# Patient Record
Sex: FEMALE | Race: BLACK OR AFRICAN AMERICAN | NOT HISPANIC OR LATINO | Employment: UNEMPLOYED | URBAN - METROPOLITAN AREA
[De-identification: names, ages, dates, MRNs, and addresses within clinical notes are randomized per-mention and may not be internally consistent; named-entity substitution may affect disease eponyms.]

---

## 2017-01-25 ENCOUNTER — ROUTINE PRENATAL (OUTPATIENT)
Dept: OBGYN | Facility: CLINIC | Age: 27
End: 2017-01-25
Payer: MEDICAID

## 2017-01-25 ENCOUNTER — ROUTINE PRENATAL (OUTPATIENT)
Dept: OBGYN | Facility: CLINIC | Age: 27
End: 2017-01-25

## 2017-01-25 VITALS — DIASTOLIC BLOOD PRESSURE: 62 MMHG | SYSTOLIC BLOOD PRESSURE: 122 MMHG | BODY MASS INDEX: 34.72 KG/M2 | WEIGHT: 242 LBS

## 2017-01-25 DIAGNOSIS — O28.0 ABNORMAL QUAD SCREEN: ICD-10-CM

## 2017-01-25 LAB
NST ACOUSTIC STIMULATION: NORMAL
NST ACTION NECESSARY: NORMAL
NST ASSESSMENT: REACTIVE
NST BASELINE: 150
NST INDICATIONS: NORMAL
NST OTHER DATA: NORMAL
NST READ BY: NORMAL
NST RETURN: NORMAL
NST UTERINE ACTIVITY: NORMAL

## 2017-01-25 PROCEDURE — 59025 FETAL NON-STRESS TEST: CPT | Performed by: OBSTETRICS & GYNECOLOGY

## 2017-01-25 PROCEDURE — 90040 PR PRENATAL FOLLOW UP: CPT | Performed by: OBSTETRICS & GYNECOLOGY

## 2017-01-25 NOTE — MR AVS SNAPSHOT
Dulce Livecassandra   2017 3:30 PM   Routine Prenatal   MRN: [id number]    [facility]   Dept Phone:  [phone]    Description:  Female : 1990   Provider:  Ronnie Cantor M.D.           Allergies as of 2017     Not on File      You were diagnosed with     Abnormal quad screen   [604766]         Vital Signs     Blood Pressure Weight Last Menstrual Period Smoking Status          122/62 mmHg 109.77 kg (242 lb) 2016 Never Smoker         Basic Information     Date Of Birth Sex Race Ethnicity Preferred Language    1990 Female Black or  Non- English      Your appointments     2017 10:00 AM   Fetal Non-Stress Test with PC NST   [facility])    [address] [place] [address] [address]  [employer] [place] [address]   [phone]            2017 11:00 AM   OB Follow Up with Bhakti Patiño D.N.P.   [facility])    [address] [place] [address]  [employer] [place] [address]   [phone]            2017 10:00 AM   Fetal Non-Stress Test with PC NST   [facility])    [address] [place] [address]  [employer] [place] [address]   [phone]              Problem List              ICD-10-CM Priority Class Noted - Resolved    Supervision of other normal pregnancy, antepartum Z34.80   2016 - Present    Abnormal quad screen O28.0   2016 - Present      Health Maintenance        Date Due Completion Dates    IMM INFLUENZA (1) 2016 ---    PAP SMEAR 2019    IMM DTaP/Tdap/Td Vaccine (2 - Td) 2026            Results     Fetal Nonstress Test      Component    NST Indications    abnormal AFP tetra    NST Baseline    150    NST Uterine Activity    rare contractions    NST Acoustic Stimulation    ×1    NST Assessment    reactive    NST Action Necessary    none    NST Other Data    NST Return    NST Read By                        Current Immunizations     Tdap Vaccine 2016 10:10 AM      Below and/or attached  are the medications your provider expects you to take. Review all of your home medications and newly ordered medications with your provider and/or pharmacist. Follow medication instructions as directed by your provider and/or pharmacist. Please keep your medication list with you and share with your provider. Update the information when medications are discontinued, doses are changed, or new medications (including over-the-counter products) are added; and carry medication information at all times in the event of emergency situations     Allergies:  No Known Allergies          Medications  Valid as of: January 25, 2017 -  4:40 PM    Generic Name Brand Name Tablet Size Instructions for use    Prenatal Multivit-Min-Fe-FA   Take  by mouth.        .                 Medicines prescribed today were sent to:     [facility] [address] [place] [address] - [address] [place] [address]    Phone: [phone] Fax: [phone]    Open 24 Hours?: No      Medication refill instructions:       If your prescription bottle indicates you have medication refills left, it is not necessary to call your provider’s office. Please contact your pharmacy and they will refill your medication.    If your prescription bottle indicates you do not have any refills left, you may request refills at any time through one of the following ways: The online [employer] system (except Urgent Care), by calling your provider’s office, or by asking your pharmacy to contact your provider’s office with a refill request. Medication refills are processed only during regular business hours and may not be available until the next business day. Your provider may request additional information or to have a follow-up visit with you prior to refilling your medication.   *Please Note: Medication refills are assigned a new Rx number when refilled electronically. Your pharmacy may indicate that no refills were authorized even though a new  prescription for the same medication is available at the pharmacy. Please request the medicine by name with the pharmacy before contacting your provider for a refill.        Other Notes About Your Plan     GIRL           MyChart Status: Patient Declined

## 2017-01-25 NOTE — MR AVS SNAPSHOT
Dulce Livecassandra   2017 3:00 PM   Routine Prenatal   MRN: [id number]    [facility]  [facility]   Dept Phone:  [phone]    Description:  Female : 1990   Provider:  Ronnie Cantor M.D.           Allergies as of 2017     Not on File      You were diagnosed with     Abnormal quad screen   [[id number]]         Vital Signs     Last Menstrual Period Smoking Status                2016 Never Smoker           Basic Information     Date Of Birth Sex Race Ethnicity Preferred Language    1990 Female Black or  Non- English      Your appointments     2017 10:00 AM   Fetal Non-Stress Test with PC NST   [facility])    [address] [place] [address]  [place] [address] [phone]            2017 11:00 AM   OB Follow Up with Bhakti Patiño D.N.P.   [facility])    [address] [place] [address] [address]  [place] [address]   [phone]            2017 10:00 AM   Fetal Non-Stress Test with PC NST   [facility])    [address] [place] [address]  [place] [address]   [phone]              Problem List              ICD-10-CM Priority Class Noted - Resolved    Supervision of other normal pregnancy, antepartum Z34.80   2016 - Present    Abnormal quad screen O28.0   2016 - Present      Health Maintenance        Date Due Completion Dates    IMM INFLUENZA (1) 2016 ---    PAP SMEAR 2019    IMM DTaP/Tdap/Td Vaccine (2 - Td) 2026            Current Immunizations     Tdap Vaccine 2016 10:10 AM      Below and/or attached are the medications your provider expects you to take. Review all of your home medications and newly ordered medications with your provider and/or pharmacist. Follow medication instructions as directed by your provider and/or pharmacist. Please keep your medication list with you and share with your provider. Update the information when medications are discontinued,  doses are changed, or new medications (including over-the-counter products) are added; and carry medication information at all times in the event of emergency situations     Allergies:  No Known Allergies          Medications  Valid as of: January 25, 2017 -  4:40 PM    Generic Name Brand Name Tablet Size Instructions for use    Prenatal Multivit-Min-Fe-FA   Take  by mouth.        .                 Medicines prescribed today were sent to:     [facility] [address] [place] [address], [place] - [address] [place] [address]    Phone: [phone] Fax: [phone]    Open 24 Hours?: No      Medication refill instructions:       If your prescription bottle indicates you have medication refills left, it is not necessary to call your provider’s office. Please contact your pharmacy and they will refill your medication.    If your prescription bottle indicates you do not have any refills left, you may request refills at any time through one of the following ways: The online [email or web address] system (except Urgent Care), by calling your provider’s office, or by asking your pharmacy to contact your provider’s office with a refill request. Medication refills are processed only during regular business hours and may not be available until the next business day. Your provider may request additional information or to have a follow-up visit with you prior to refilling your medication.   *Please Note: Medication refills are assigned a new Rx number when refilled electronically. Your pharmacy may indicate that no refills were authorized even though a new prescription for the same medication is available at the pharmacy. Please request the medicine by name with the pharmacy before contacting your provider for a refill.        Other Notes About Your Plan     GIRL           MyChart Status: Patient Declined

## 2017-01-25 NOTE — PROGRESS NOTES
OB Followup;    34w4d    Patient Active Problem List    Diagnosis Date Noted   • Abnormal quad screen 11/17/2016   • Supervision of other normal pregnancy, antepartum 09/02/2016       Filed Vitals:    01/25/17 1538   BP: 122/62   Weight: 109.77 kg (242 lb)       Patient presents for followup of OB care. Currently doing well . Good fetal movement no leakage of fluid no contractions or vaginal bleeding        Size equals dates, normal fetal heart rate    Unexplained abnormal AFP tetra testing:  Patient's history of abnormal AFP quad testing with normal NIPS and normal fetal anatomy on ultrasound by M. Twice weekly NSTs are indicated.    Labor precautions given  Increase oral hydration    Followup in one weeks

## 2017-01-25 NOTE — PROGRESS NOTES
Pt. Here for OB/FU today. Reports Good FM.   Good # [phone]  Pt states no complaints.   Pharmacy verified.

## 2017-01-31 ENCOUNTER — ROUTINE PRENATAL (OUTPATIENT)
Dept: OBGYN | Facility: CLINIC | Age: 27
End: 2017-01-31

## 2017-01-31 ENCOUNTER — HOSPITAL ENCOUNTER (OUTPATIENT)
Facility: MEDICAL CENTER | Age: 27
End: 2017-01-31
Attending: NURSE PRACTITIONER
Payer: COMMERCIAL

## 2017-01-31 ENCOUNTER — ROUTINE PRENATAL (OUTPATIENT)
Dept: OBGYN | Facility: CLINIC | Age: 27
End: 2017-01-31
Payer: MEDICAID

## 2017-01-31 VITALS — WEIGHT: 244 LBS | BODY MASS INDEX: 35.01 KG/M2 | SYSTOLIC BLOOD PRESSURE: 120 MMHG | DIASTOLIC BLOOD PRESSURE: 59 MMHG

## 2017-01-31 DIAGNOSIS — O28.0 ABNORMAL QUAD SCREEN: ICD-10-CM

## 2017-01-31 LAB
NST ACOUSTIC STIMULATION: NORMAL
NST ACTION NECESSARY: NORMAL
NST ASSESSMENT: NORMAL
NST BASELINE: 145
NST INDICATIONS: NORMAL
NST OTHER DATA: NORMAL
NST READ BY: NORMAL
NST RETURN: NORMAL
NST UTERINE ACTIVITY: NORMAL

## 2017-01-31 PROCEDURE — 90040 PR PRENATAL FOLLOW UP: CPT | Performed by: NURSE PRACTITIONER

## 2017-01-31 PROCEDURE — 59025 FETAL NON-STRESS TEST: CPT | Performed by: NURSE PRACTITIONER

## 2017-01-31 NOTE — MR AVS SNAPSHOT
Dulce Pineda   2017 11:00 AM   Routine Prenatal   MRN: [id number]    [facility]   Dept Phone:  [phone]    Description:  Female : 1990   Provider:  Bhakti Patiño D.N.P.           Allergies as of 2017     No Known Allergies      You were diagnosed with     Abnormal quad screen   [[id number]]         Vital Signs     Blood Pressure Weight Last Menstrual Period Smoking Status          120/59 mmHg 110.678 kg (244 lb) 2016 Never Smoker         Basic Information     Date Of Birth Sex Race Ethnicity Preferred Language    1990 Female Black or  Non- English      Your appointments     2017 10:00 AM   Fetal Non-Stress Test with PC NST   [facility] ([facility])    [address] [place] [address] [address]  [facility] [address] [phone]              Problem List              ICD-10-CM Priority Class Noted - Resolved    Supervision of other normal pregnancy, antepartum Z34.80   2016 - Present    Abnormal quad screen O28.0   2016 - Present      Health Maintenance        Date Due Completion Dates    IMM INFLUENZA (1) 2016 ---    PAP SMEAR 2019    IMM DTaP/Tdap/Td Vaccine (2 - Td) 2026            Current Immunizations     Tdap Vaccine 2016 10:10 AM      Below and/or attached are the medications your provider expects you to take. Review all of your home medications and newly ordered medications with your provider and/or pharmacist. Follow medication instructions as directed by your provider and/or pharmacist. Please keep your medication list with you and share with your provider. Update the information when medications are discontinued, doses are changed, or new medications (including over-the-counter products) are added; and carry medication information at all times in the event of emergency situations     Allergies:  No Known Allergies          Medications  Valid as of: 2017 - 11:24 AM    Generic Name Brand Name Tablet Size Instructions for use    Prenatal Multivit-Min-Fe-FA   Take  by mouth.        .                 Medicines prescribed today were sent to:     [facility] [address] [place] [address] [place] - [address] [place] [address]    Phone: [phone] Fax: [phone]    Open 24 Hours?: No      Medication refill instructions:       If your prescription bottle indicates you have medication refills left, it is not necessary to call your provider’s office. Please contact your pharmacy and they will refill your medication.    If your prescription bottle indicates you do not have any refills left, you may request refills at any time through one of the following ways: The online [employer] system (except Urgent Care), by calling your provider’s office, or by asking your pharmacy to contact your provider’s office with a refill request. Medication refills are processed only during regular business hours and may not be available until the next business day. Your provider may request additional information or to have a follow-up visit with you prior to refilling your medication.   *Please Note: Medication refills are assigned a new Rx number when refilled electronically. Your pharmacy may indicate that no refills were authorized even though a new prescription for the same medication is available at the pharmacy. Please request the medicine by name with the pharmacy before contacting your provider for a refill.        Your To Do List     Future Labs/Procedures Complete By Expires    GRP B STREP, BY PCR (PEREZ BROTH)  As directed 1/31/2018    Comments:    Source - genital/rectal    INDUCTION OF LABOR  As directed 1/31/2018      Other Notes About Your Plan     GIRL           MyChart Status: Patient Declined

## 2017-01-31 NOTE — MR AVS SNAPSHOT
Dulce Livecassandra   2017 10:00 AM   Routine Prenatal   MRN: [id number]    [facility]   Dept Phone:  [phone]    Description:  Female : 1990   Provider:  Bhakti Patiño D.N.P.           Allergies as of 2017     No Known Allergies      You were diagnosed with     Abnormal quad screen   [[id number]]         Vital Signs     Last Menstrual Period Smoking Status                2016 Never Smoker           Basic Information     Date Of Birth Sex Race Ethnicity Preferred Language    1990 Female Black or  Non- English      Your appointments     2017 10:00 AM   Fetal Non-Stress Test with PC NST   [facility])    [address] [facility] [address]  [place] [address] [phone]              Problem List              ICD-10-CM Priority Class Noted - Resolved    Supervision of other normal pregnancy, antepartum Z34.80   2016 - Present    Abnormal quad screen O28.0   2016 - Present      Health Maintenance        Date Due Completion Dates    IMM INFLUENZA (1) 2016 ---    PAP SMEAR 2019    IMM DTaP/Tdap/Td Vaccine (2 - Td) 2026            Results     POCT NST      Component    NST Indications    abnormal AFP    NST Baseline    145    NST Uterine Activity    none    NST Acoustic Stimulation    none    NST Assessment    Category one, pos accels, no decels, moderate variability    NST Action Necessary    NST Other Data    NST Return    NST Read By                        Current Immunizations     Tdap Vaccine 2016 10:10 AM      Below and/or attached are the medications your provider expects you to take. Review all of your home medications and newly ordered medications with your provider and/or pharmacist. Follow medication instructions as directed by your provider and/or pharmacist. Please keep your medication list with you and share with your provider. Update the information when medications  are discontinued, doses are changed, or new medications (including over-the-counter products) are added; and carry medication information at all times in the event of emergency situations     Allergies:  No Known Allergies          Medications  Valid as of: January 31, 2017 - 11:24 AM    Generic Name Brand Name Tablet Size Instructions for use    Prenatal Multivit-Min-Fe-FA   Take  by mouth.        .                 Medicines prescribed today were sent to:     [facility] [address] [place] [address], [place] - [address] [place] [address]    Phone: [phone] Fax: [phone]    Open 24 Hours?: No      Medication refill instructions:       If your prescription bottle indicates you have medication refills left, it is not necessary to call your provider’s office. Please contact your pharmacy and they will refill your medication.    If your prescription bottle indicates you do not have any refills left, you may request refills at any time through one of the following ways: The online [email or web address] system (except Urgent Care), by calling your provider’s office, or by asking your pharmacy to contact your provider’s office with a refill request. Medication refills are processed only during regular business hours and may not be available until the next business day. Your provider may request additional information or to have a follow-up visit with you prior to refilling your medication.   *Please Note: Medication refills are assigned a new Rx number when refilled electronically. Your pharmacy may indicate that no refills were authorized even though a new prescription for the same medication is available at the pharmacy. Please request the medicine by name with the pharmacy before contacting your provider for a refill.        Other Notes About Your Plan     GIRL           [employer] Status: Patient Declined

## 2017-01-31 NOTE — PROGRESS NOTES
Pt here today for OB follow up   GBS to be done today  Reports +FM  WT: 244 lb  BP: 120/59  Pt states no complications today  Good # [phone]

## 2017-01-31 NOTE — PROGRESS NOTES
S) Pt is a 26 y.o.   at 35w3d  gestation. Routine prenatal care today. States no specific problems today. No follow up indicated with PANN. Continuing 2x week nst for abnormal afp.  Reports good  fetal movement. Denies cramping, bleeding or leaking of fluid. Denies dysuria. Generally feels well today. Good self-care activities identified. Denies headaches.     O) see flow         Filed Vitals:    17 1045   BP: 120/59   Weight: 110.678 kg (244 lb)           Lab:  Recent Results (from the past 336 hour(s))   Fetal Nonstress Test    Collection Time: 17  3:50 PM   Result Value Ref Range    NST Indications abnormal AFP tetra     NST Baseline 150     NST Uterine Activity rare contractions     NST Acoustic Stimulation ×1     NST Assessment reactive     NST Action Necessary none     NST Other Data      NST Return      NST Read By     POCT NST    Collection Time: 17 10:17 AM   Result Value Ref Range    NST Indications      NST Baseline      NST Uterine Activity      NST Acoustic Stimulation      NST Assessment      NST Action Necessary      NST Other Data      NST Return      NST Read By            Pertinent ultrasound - See PANN documents           A) IUP at 35w3d       S=D         Patient Active Problem List    Diagnosis Date Noted   • Abnormal quad screen 2016   • Supervision of other normal pregnancy, antepartum 2016       P) s/s ptl vs general discomforts. Fetal movements reviewed. General ed and anticipatory guidance. Nutrition/exercise/vitamin. Plans breast.  Plans pp contraception - OCP. Continue 2x week nst. Order placed for induction of labor to be scheduled at 39 weeks. Continue PNV.

## 2017-02-02 LAB — GP B STREP DNA SPEC QL NAA+PROBE: NEGATIVE

## 2017-02-03 ENCOUNTER — ROUTINE PRENATAL (OUTPATIENT)
Dept: OBGYN | Facility: CLINIC | Age: 27
End: 2017-02-03

## 2017-02-03 DIAGNOSIS — O28.0 ABNORMAL ANTENATAL ALPHA FETOPROTEIN SCREEN: ICD-10-CM

## 2017-02-03 LAB
NST ACOUSTIC STIMULATION: YES
NST ACTION NECESSARY: NORMAL
NST ASSESSMENT: REACTIVE
NST BASELINE: 145
NST INDICATIONS: NORMAL
NST OTHER DATA: NORMAL
NST READ BY: NORMAL
NST RETURN: NORMAL
NST UTERINE ACTIVITY: NO

## 2017-02-03 PROCEDURE — 59025 FETAL NON-STRESS TEST: CPT | Performed by: OBSTETRICS & GYNECOLOGY

## 2017-02-03 NOTE — MR AVS SNAPSHOT
Dulce Livecassandra   2/3/2017 10:00 AM   Routine Prenatal   MRN: [id number]    [facility]   Dept Phone:  [phone]    Description:  Female : 1990   Provider:  Shaheen Hitchcock M.D.           Allergies as of 2/3/2017     No Known Allergies      You were diagnosed with     Abnormal  alpha fetoprotein screen   [[id number]]         Vital Signs     Last Menstrual Period Smoking Status                2016 Never Smoker           Basic Information     Date Of Birth Sex Race Ethnicity Preferred Language    1990 Female Black or  Non- English      Your appointments     2017  9:00 AM   [facility] INDUCTION OF LABOR with NON-SURGICAL L&D   LABOR & DELIVERY [facility] (--)    [address] [facility] [address] [phone]              Problem List              ICD-10-CM Priority Class Noted - Resolved    Supervision of other normal pregnancy, antepartum Z34.80   2016 - Present    Abnormal quad screen O28.0   2016 - Present      Health Maintenance        Date Due Completion Dates    IMM INFLUENZA (1) 2016 ---    PAP SMEAR 2019    IMM DTaP/Tdap/Td Vaccine (2 - Td) 2026            Results     POCT Fetal Nonstress Test      Component    NST Indications    abnormal AFP    NST Baseline    145    NST Uterine Activity    no    NST Acoustic Stimulation    yes    NST Assessment    reactive    Comment:     after vas    NST Action Necessary    NST Other Data    NST Return    NST Read By    Naldo                        Current Immunizations     Tdap Vaccine 2016 10:10 AM      Below and/or attached are the medications your provider expects you to take. Review all of your home medications and newly ordered medications with your provider and/or pharmacist. Follow medication instructions as directed by your provider and/or pharmacist. Please keep your medication list with you and share with your provider. Update the  information when medications are discontinued, doses are changed, or new medications (including over-the-counter products) are added; and carry medication information at all times in the event of emergency situations     Allergies:  No Known Allergies          Medications  Valid as of: February 03, 2017 - 11:15 AM    Generic Name Brand Name Tablet Size Instructions for use    Prenatal Multivit-Min-Fe-FA   Take  by mouth.        .                 Medicines prescribed today were sent to:     [facility] [address] [place] [address], [place] - [address] [place] [address]    Phone: [phone] Fax: [phone]    Open 24 Hours?: No      Medication refill instructions:       If your prescription bottle indicates you have medication refills left, it is not necessary to call your provider’s office. Please contact your pharmacy and they will refill your medication.    If your prescription bottle indicates you do not have any refills left, you may request refills at any time through one of the following ways: The online [employer] system (except Urgent Care), by calling your provider’s office, or by asking your pharmacy to contact your provider’s office with a refill request. Medication refills are processed only during regular business hours and may not be available until the next business day. Your provider may request additional information or to have a follow-up visit with you prior to refilling your medication.   *Please Note: Medication refills are assigned a new Rx number when refilled electronically. Your pharmacy may indicate that no refills were authorized even though a new prescription for the same medication is available at the pharmacy. Please request the medicine by name with the pharmacy before contacting your provider for a refill.        Other Notes About Your Plan     GIRL           Biscottihart Status: Patient Declined

## 2017-02-07 ENCOUNTER — ROUTINE PRENATAL (OUTPATIENT)
Dept: OBGYN | Facility: CLINIC | Age: 27
End: 2017-02-07

## 2017-02-07 DIAGNOSIS — R77.2 ABNORMAL AFP3 TEST: ICD-10-CM

## 2017-02-07 LAB
NST ACOUSTIC STIMULATION: NO
NST ACTION NECESSARY: NORMAL
NST ASSESSMENT: REACTIVE
NST BASELINE: 150
NST INDICATIONS: NORMAL
NST OTHER DATA: NORMAL
NST READ BY: NORMAL
NST RETURN: NORMAL
NST UTERINE ACTIVITY: NO

## 2017-02-07 PROCEDURE — 59025 FETAL NON-STRESS TEST: CPT | Performed by: OBSTETRICS & GYNECOLOGY

## 2017-02-07 NOTE — MR AVS SNAPSHOT
Dulce Corley Miriam   2017 10:00 AM   Routine Prenatal   MRN: [id number]    [facility]   Dept Phone:  [phone]    Description:  Female : 1990   Provider:  Shaheen Hitchcock M.D.           Allergies as of 2017     No Known Allergies      You were diagnosed with     Abnormal AFP3 test   [740698]         Vital Signs     Last Menstrual Period Smoking Status                2016 Never Smoker           Basic Information     Date Of Birth Sex Race Ethnicity Preferred Language    1990 Female Black or  Non- English      Your appointments     Feb 10, 2017  8:30 AM   Fetal Non-Stress Test with PC NST   [facility] [address]  [facility] [id number] [phone]            Feb 10, 2017  9:15 AM   OB Follow Up with PC MD   [facility] [address]  [facility] [address] [phone]            2017  8:00 AM   TPC INDUCTION OF LABOR with NON-SURGICAL L&D   LABOR & DELIVERY [facility] (--)    [address] [facility] [address]   [phone]              Problem List              ICD-10-CM Priority Class Noted - Resolved    Supervision of other normal pregnancy, antepartum Z34.80   2016 - Present    Abnormal quad screen O28.0   2016 - Present      Health Maintenance        Date Due Completion Dates    IMM INFLUENZA (1) 2016 ---    PAP SMEAR 2019    IMM DTaP/Tdap/Td Vaccine (2 - Td) 2026            Results     POCT Fetal Nonstress Test      Component    NST Indications    Abnroaml AFP    NST Baseline    150    Comment:     `    NST Uterine Activity    no    NST Acoustic Stimulation    no    NST Assessment    reactive    NST Action Necessary    NST Other Data    NST Return    NST Read By    Naldo                        Current Immunizations     Tdap Vaccine 2016 10:10 AM      Below and/or attached are the medications your provider expects you to  take. Review all of your home medications and newly ordered medications with your provider and/or pharmacist. Follow medication instructions as directed by your provider and/or pharmacist. Please keep your medication list with you and share with your provider. Update the information when medications are discontinued, doses are changed, or new medications (including over-the-counter products) are added; and carry medication information at all times in the event of emergency situations     Allergies:  No Known Allergies          Medications  Valid as of: February 07, 2017 - 12:57 PM    Generic Name Brand Name Tablet Size Instructions for use    Prenatal Multivit-Min-Fe-FA   Take  by mouth.        .                 Medicines prescribed today were sent to:     [facility] [address] [place] [address], [place] - [address] [facility]    [address] [facility] [place] [address]    Phone: [phone] Fax: [phone]    Open 24 Hours?: No      Medication refill instructions:       If your prescription bottle indicates you have medication refills left, it is not necessary to call your provider’s office. Please contact your pharmacy and they will refill your medication.    If your prescription bottle indicates you do not have any refills left, you may request refills at any time through one of the following ways: The online Chatterous system (except Urgent Care), by calling your provider’s office, or by asking your pharmacy to contact your provider’s office with a refill request. Medication refills are processed only during regular business hours and may not be available until the next business day. Your provider may request additional information or to have a follow-up visit with you prior to refilling your medication.   *Please Note: Medication refills are assigned a new Rx number when refilled electronically. Your pharmacy may indicate that no refills were authorized even though a new prescription for the same medication is available at  the pharmacy. Please request the medicine by name with the pharmacy before contacting your provider for a refill.        Other Notes About Your Plan     GIRL           MyChart Status: Patient Declined

## 2017-02-10 ENCOUNTER — ROUTINE PRENATAL (OUTPATIENT)
Dept: OBGYN | Facility: CLINIC | Age: 27
End: 2017-02-10

## 2017-02-10 VITALS — DIASTOLIC BLOOD PRESSURE: 64 MMHG | SYSTOLIC BLOOD PRESSURE: 115 MMHG | BODY MASS INDEX: 34.87 KG/M2 | WEIGHT: 243 LBS

## 2017-02-10 DIAGNOSIS — O28.0 ABNORMAL QUAD SCREEN: ICD-10-CM

## 2017-02-10 LAB
NST ACOUSTIC STIMULATION: NO
NST ACTION NECESSARY: NORMAL
NST ASSESSMENT: REACTIVE
NST BASELINE: 130
NST INDICATIONS: NORMAL
NST OTHER DATA: NORMAL
NST READ BY: NORMAL
NST RETURN: NORMAL
NST UTERINE ACTIVITY: NORMAL

## 2017-02-10 PROCEDURE — 59025 FETAL NON-STRESS TEST: CPT | Performed by: OBSTETRICS & GYNECOLOGY

## 2017-02-10 PROCEDURE — 90040 PR PRENATAL FOLLOW UP: CPT | Performed by: OBSTETRICS & GYNECOLOGY

## 2017-02-10 NOTE — PROGRESS NOTES
Pt here for OB f/u. Denies VB,LOF. Reports +FM and irregular UCs.  Good phone# [phone]  Pharmacy verified with pt.  Pt states she is aware IOL date and time.

## 2017-02-10 NOTE — MR AVS SNAPSHOT
Dulce Livecassandra   2/10/2017 8:30 AM   Routine Prenatal   MRN: [id number]    [facility]   Dept Phone:  [phone]    Description:  Female : 1990   Provider:  Aylin Baker M.D.           Allergies as of 2/10/2017     No Known Allergies      You were diagnosed with     Abnormal quad screen   [[id number]]         Vital Signs     Last Menstrual Period Smoking Status                2016 Never Smoker           Basic Information     Date Of Birth Sex Race Ethnicity Preferred Language    1990 Female Black or  Non- English      Your appointments     2017  8:00 AM   TPC INDUCTION OF LABOR with NON-SURGICAL L&D   LABOR & DELIVERY [facility] (--)    [address] [facility] [address] [phone]              Problem List              ICD-10-CM Priority Class Noted - Resolved    Supervision of other normal pregnancy, antepartum Z34.80   2016 - Present    Abnormal quad screen O28.0   2016 - Present      Health Maintenance        Date Due Completion Dates    IMM INFLUENZA (1) 2016 ---    PAP SMEAR 2019    IMM DTaP/Tdap/Td Vaccine (2 - Td) 2026            Results     POCT NST      Component    NST Indications    abn quad    NST Baseline    130    NST Uterine Activity    one    NST Acoustic Stimulation    no    NST Assessment    reactive    NST Action Necessary    NST Other Data    NST Return    NST Read By                        Current Immunizations     Tdap Vaccine 2016 10:10 AM      Below and/or attached are the medications your provider expects you to take. Review all of your home medications and newly ordered medications with your provider and/or pharmacist. Follow medication instructions as directed by your provider and/or pharmacist. Please keep your medication list with you and share with your provider. Update the information when medications are discontinued, doses are changed, or new medications  (including over-the-counter products) are added; and carry medication information at all times in the event of emergency situations     Allergies:  No Known Allergies          Medications  Valid as of: February 10, 2017 - 10:00 AM    Generic Name Brand Name Tablet Size Instructions for use    Prenatal Multivit-Min-Fe-FA   Take  by mouth.        .                 Medicines prescribed today were sent to:     [facility] [address] [place] [address], [place] - [address] [place] [address]    Phone: [phone] Fax: [phone]    Open 24 Hours?: No      Medication refill instructions:       If your prescription bottle indicates you have medication refills left, it is not necessary to call your provider’s office. Please contact your pharmacy and they will refill your medication.    If your prescription bottle indicates you do not have any refills left, you may request refills at any time through one of the following ways: The online Dayjet system (except Urgent Care), by calling your provider’s office, or by asking your pharmacy to contact your provider’s office with a refill request. Medication refills are processed only during regular business hours and may not be available until the next business day. Your provider may request additional information or to have a follow-up visit with you prior to refilling your medication.   *Please Note: Medication refills are assigned a new Rx number when refilled electronically. Your pharmacy may indicate that no refills were authorized even though a new prescription for the same medication is available at the pharmacy. Please request the medicine by name with the pharmacy before contacting your provider for a refill.        Other Notes About Your Plan     GIRL           MyChart Status: Patient Declined

## 2017-02-10 NOTE — MR AVS SNAPSHOT
Dulce Pineda   2/10/2017 9:15 AM   Routine Prenatal   MRN: [id number]    [facility]   Dept Phone:  [phone]    Description:  Female : 1990   Provider:  Aylin Baker M.D.           Allergies as of 2/10/2017     No Known Allergies      You were diagnosed with     Abnormal quad screen   [[id number]]         Vital Signs     Blood Pressure Weight Last Menstrual Period Smoking Status          115/64 mmHg 110.224 kg (243 lb) 2016 Never Smoker         Basic Information     Date Of Birth Sex Race Ethnicity Preferred Language    1990 Female Black or  Non- English      Your appointments     2017  8:00 AM   TPC INDUCTION OF LABOR with NON-SURGICAL L&D   [facility] (--)    [address] [place] [address] [phone]              Problem List              ICD-10-CM Priority Class Noted - Resolved    Supervision of other normal pregnancy, antepartum Z34.80   2016 - Present    Abnormal quad screen O28.0   2016 - Present      Health Maintenance        Date Due Completion Dates    IMM INFLUENZA (1) 2016 ---    PAP SMEAR 2019    IMM DTaP/Tdap/Td Vaccine (2 - Td) 2026            Current Immunizations     Tdap Vaccine 2016 10:10 AM      Below and/or attached are the medications your provider expects you to take. Review all of your home medications and newly ordered medications with your provider and/or pharmacist. Follow medication instructions as directed by your provider and/or pharmacist. Please keep your medication list with you and share with your provider. Update the information when medications are discontinued, doses are changed, or new medications (including over-the-counter products) are added; and carry medication information at all times in the event of emergency situations     Allergies:  No Known Allergies          Medications  Valid as of: February 10, 2017 - 10:00 AM       Generic Name Brand Name Tablet Size Instructions for use    Prenatal Multivit-Min-Fe-FA   Take  by mouth.        .                 Medicines prescribed today were sent to:     [facility] [address] [place] [address], [place] - [address] [place] [address]    Phone: [phone] Fax: [phone]    Open 24 Hours?: No      Medication refill instructions:       If your prescription bottle indicates you have medication refills left, it is not necessary to call your provider’s office. Please contact your pharmacy and they will refill your medication.    If your prescription bottle indicates you do not have any refills left, you may request refills at any time through one of the following ways: The online BigTwist system (except Urgent Care), by calling your provider’s office, or by asking your pharmacy to contact your provider’s office with a refill request. Medication refills are processed only during regular business hours and may not be available until the next business day. Your provider may request additional information or to have a follow-up visit with you prior to refilling your medication.   *Please Note: Medication refills are assigned a new Rx number when refilled electronically. Your pharmacy may indicate that no refills were authorized even though a new prescription for the same medication is available at the pharmacy. Please request the medicine by name with the pharmacy before contacting your provider for a refill.        Other Notes About Your Plan     GIRL           [employer] Status: Patient Declined

## 2017-02-10 NOTE — PROGRESS NOTES
Dulce Pineda is a 26 y.o.  at 36w6d here today for obstetrical visit.  Patient is without complaints.    She reports good fetal movement.  She denies vaginal bleeding.  She denies rupture of membranes.  She denies contractions.     has Supervision of other normal pregnancy, antepartum and Abnormal quad screen on her problem list.    Induction of labor is scheduled for  at 8 AM  GBS negative      A/P IUP at 36w6d  AFP done  1 hour glucola done  Rhogam a pos  GBS neg    F/U in 1 weeks

## 2017-02-14 ENCOUNTER — ROUTINE PRENATAL (OUTPATIENT)
Dept: OBGYN | Facility: CLINIC | Age: 27
End: 2017-02-14

## 2017-02-14 DIAGNOSIS — O28.0 ABNORMAL QUAD SCREEN: ICD-10-CM

## 2017-02-14 LAB
NST ACOUSTIC STIMULATION: NORMAL
NST ACTION NECESSARY: NORMAL
NST ASSESSMENT: REACTIVE
NST BASELINE: 140
NST INDICATIONS: NORMAL
NST OTHER DATA: NORMAL
NST READ BY: NORMAL
NST RETURN: NORMAL
NST UTERINE ACTIVITY: NORMAL

## 2017-02-14 PROCEDURE — 59025 FETAL NON-STRESS TEST: CPT | Performed by: NURSE PRACTITIONER

## 2017-02-14 NOTE — MR AVS SNAPSHOT
Dulce Corley Miriam   2017 10:30 AM   Routine Prenatal   MRN: [id number]    [facility]   Dept Phone:  [phone]    Description:  Female : 1990   Provider:  REENA Pineda           Allergies as of 2017     No Known Allergies      You were diagnosed with     Abnormal quad screen   [[id number]]         Vital Signs     Last Menstrual Period Smoking Status                2016 Never Smoker           Basic Information     Date Of Birth Sex Race Ethnicity Preferred Language    1990 Female Black or  Non- English      Your appointments     2017  8:30 AM   Fetal Non-Stress Test with LOIDA GUTIERREZ   [facility] [address]  [place] [id number] [phone]            2017  9:00 AM   OB Follow Up with LOIDA DIAZ   [facility] [address]  [place] [address] [phone]            2017  8:00 AM   TPC INDUCTION OF LABOR with NON-SURGICAL L&D   LAB[facility] & DELIVERY [facility] (--)    [address] [facility] [place] [address]   [phone]              Problem List              ICD-10-CM Priority Class Noted - Resolved    Supervision of other normal pregnancy, antepartum Z34.80   2016 - Present    Abnormal quad screen O28.0   2016 - Present      Health Maintenance        Date Due Completion Dates    IMM INFLUENZA (1) 2016 ---    PAP SMEAR 2019    IMM DTaP/Tdap/Td Vaccine (2 - Td) 2026            Results       Current Immunizations     Tdap Vaccine 2016 10:10 AM      Below and/or attached are the medications your provider expects you to take. Review all of your home medications and newly ordered medications with your provider and/or pharmacist. Follow medication instructions as directed by your provider and/or pharmacist. Please keep your medication list with you and share with your provider. Update the information when medications are  discontinued, doses are changed, or new medications (including over-the-counter products) are added; and carry medication information at all times in the event of emergency situations     Allergies:  No Known Allergies          Medications  Valid as of: February 14, 2017 - 10:40 AM    Generic Name Brand Name Tablet Size Instructions for use    Prenatal Multivit-Min-Fe-FA   Take  by mouth.        .                 Medicines prescribed today were sent to:     [facility] [address] [place] [address] - [address] [place] [address]    Phone: [phone] Fax: [phone]    Open 24 Hours?: No      Medication refill instructions:       If your prescription bottle indicates you have medication refills left, it is not necessary to call your provider’s office. Please contact your pharmacy and they will refill your medication.    If your prescription bottle indicates you do not have any refills left, you may request refills at any time through one of the following ways: The online Chirp Interactive system (except Urgent Care), by calling your provider’s office, or by asking your pharmacy to contact your provider’s office with a refill request. Medication refills are processed only during regular business hours and may not be available until the next business day. Your provider may request additional information or to have a follow-up visit with you prior to refilling your medication.   *Please Note: Medication refills are assigned a new Rx number when refilled electronically. Your pharmacy may indicate that no refills were authorized even though a new prescription for the same medication is available at the pharmacy. Please request the medicine by name with the pharmacy before contacting your provider for a refill.        Other Notes About Your Plan     GIRL           [employer] Status: Patient Declined

## 2017-02-21 ENCOUNTER — ROUTINE PRENATAL (OUTPATIENT)
Dept: OBGYN | Facility: CLINIC | Age: 27
End: 2017-02-21

## 2017-02-21 DIAGNOSIS — O28.0 ABNORMAL QUAD SCREEN: ICD-10-CM

## 2017-02-21 LAB
NST ACOUSTIC STIMULATION: NORMAL
NST ACTION NECESSARY: NORMAL
NST ASSESSMENT: NORMAL
NST BASELINE: NORMAL
NST INDICATIONS: NORMAL
NST OTHER DATA: NORMAL
NST READ BY: NORMAL
NST RETURN: NORMAL
NST UTERINE ACTIVITY: NORMAL

## 2017-02-21 PROCEDURE — 59025 FETAL NON-STRESS TEST: CPT | Performed by: OBSTETRICS & GYNECOLOGY

## 2017-02-21 NOTE — MR AVS SNAPSHOT
Dulce Corley Miriam   2017 11:00 AM   Routine Prenatal   MRN: [id number]    [facility]   Dept Phone:  [phone]    Description:  Female : 1990   Provider:  LOIDA GUTIERREZ           Allergies as of 2017     No Known Allergies      You were diagnosed with     Abnormal quad screen   [[id number]]         Vital Signs     Last Menstrual Period Smoking Status                2016 Never Smoker           Basic Information     Date Of Birth Sex Race Ethnicity Preferred Language    1990 Female Black or  Non- English      Your appointments     2017  8:30 AM   Fetal Non-Stress Test with LOIDA GUTIERREZ   [facility])    [address] [place] [address] [address]  [facility] [id number] [phone]            2017  9:00 AM   OB Follow Up with LOIDA DIAZ   [facility] [address]  [facility] [address] [phone]            2017  8:00 AM   TPC INDUCTION OF LABOR with NON-SURGICAL L&D   LABOR & DELIVERY [facility] (--)    [address] [facility] [address]   [phone]              Problem List              ICD-10-CM Priority Class Noted - Resolved    Supervision of other normal pregnancy, antepartum Z34.80   2016 - Present    Abnormal quad screen O28.0   2016 - Present      Health Maintenance        Date Due Completion Dates    IMM INFLUENZA (1) 2016 ---    PAP SMEAR 2019    IMM DTaP/Tdap/Td Vaccine (2 - Td) 2026            Results       Current Immunizations     Tdap Vaccine 2016 10:10 AM      Below and/or attached are the medications your provider expects you to take. Review all of your home medications and newly ordered medications with your provider and/or pharmacist. Follow medication instructions as directed by your provider and/or pharmacist. Please keep your medication list with you and share with your provider. Update the information when medications are discontinued, doses  are changed, or new medications (including over-the-counter products) are added; and carry medication information at all times in the event of emergency situations     Allergies:  No Known Allergies          Medications  Valid as of: February 21, 2017 - 11:41 AM    Generic Name Brand Name Tablet Size Instructions for use    Prenatal Multivit-Min-Fe-FA   Take  by mouth.        .                 Medicines prescribed today were sent to:     [facility] [address] [place] [address], [place] - [address] [place] [address]    Phone: [phone] Fax: [phone]    Open 24 Hours?: No      Medication refill instructions:       If your prescription bottle indicates you have medication refills left, it is not necessary to call your provider’s office. Please contact your pharmacy and they will refill your medication.    If your prescription bottle indicates you do not have any refills left, you may request refills at any time through one of the following ways: The online Bluebox Now! system (except Urgent Care), by calling your provider’s office, or by asking your pharmacy to contact your provider’s office with a refill request. Medication refills are processed only during regular business hours and may not be available until the next business day. Your provider may request additional information or to have a follow-up visit with you prior to refilling your medication.   *Please Note: Medication refills are assigned a new Rx number when refilled electronically. Your pharmacy may indicate that no refills were authorized even though a new prescription for the same medication is available at the pharmacy. Please request the medicine by name with the pharmacy before contacting your provider for a refill.        Other Notes About Your Plan     GIRL           MyChart Status: Patient Declined

## 2017-02-24 ENCOUNTER — ROUTINE PRENATAL (OUTPATIENT)
Dept: OBGYN | Facility: CLINIC | Age: 27
End: 2017-02-24

## 2017-02-24 VITALS — BODY MASS INDEX: 34.29 KG/M2 | WEIGHT: 239 LBS | DIASTOLIC BLOOD PRESSURE: 61 MMHG | SYSTOLIC BLOOD PRESSURE: 124 MMHG

## 2017-02-24 DIAGNOSIS — O28.0 ABNORMAL QUAD SCREEN: ICD-10-CM

## 2017-02-24 PROCEDURE — 90040 PR PRENATAL FOLLOW UP: CPT | Performed by: OBSTETRICS & GYNECOLOGY

## 2017-02-24 PROCEDURE — 59025 FETAL NON-STRESS TEST: CPT | Performed by: OBSTETRICS & GYNECOLOGY

## 2017-02-24 NOTE — PROGRESS NOTES
26 y.o.  38w6d The patient is here for routine obstetrical followup. She is without complaints and reports good fetal movement. She denies regular contractions, vaginal bleeding, or leaking of fluid.    The patient's pregnancy is complicated by   Patient Active Problem List    Diagnosis Date Noted   • Abnormal quad screen 2016   • Supervision of other normal pregnancy, antepartum 2016     Attempted to check cervix - could not reach.  Head not in pelvis.  Cephalic position confirmed with limited ABD US.    Followup tomorrow for IOL due to unexplained elevated AFP.  Labor precautions were discussed with patient  Fetal kick counts were discussed with patient

## 2017-02-24 NOTE — MR AVS SNAPSHOT
Dulce Pineda   2017 8:30 AM   Routine Prenatal   MRN: [id number]    Department[facility]  [facility]   Dept Phone:  [phone]    Description:  Female : 1990   Provider:  Fozia Lee M.D.           Allergies as of 2017     No Known Allergies      You were diagnosed with     Abnormal quad screen   [[id number]]         Vital Signs     Last Menstrual Period Smoking Status                2016 Never Smoker           Basic Information     Date Of Birth Sex Race Ethnicity Preferred Language    1990 Female Black or  Non- English      Your appointments     2017  8:00 AM   [facility] INDUCTION OF LABOR with NON-SURGICAL L&D   LABOR & DELIVERY [facility] (--)    [address] [facility] [address] [phone]              Problem List              ICD-10-CM Priority Class Noted - Resolved    Supervision of other normal pregnancy, antepartum Z34.80   2016 - Present    Abnormal quad screen O28.0   2016 - Present      Health Maintenance        Date Due Completion Dates    IMM INFLUENZA (1) 2016 ---    PAP SMEAR 2019    IMM DTaP/Tdap/Td Vaccine (2 - Td) 2026            Results     POCT NST      Component    NST Indications    unexplained elevated AFP    NST Baseline    140s    NST Uterine Activity    irregular ctx    NST Acoustic Stimulation    vas    NST Assessment    Cat 1    NST Action Necessary    NST Other Data    NST Return    NST Read By    Rosa                        Current Immunizations     Tdap Vaccine 2016 10:10 AM      Below and/or attached are the medications your provider expects you to take. Review all of your home medications and newly ordered medications with your provider and/or pharmacist. Follow medication instructions as directed by your provider and/or pharmacist. Please keep your medication list with you and share with your provider. Update the information when medications are discontinued, doses  are changed, or new medications (including over-the-counter products) are added; and carry medication information at all times in the event of emergency situations     Allergies:  No Known Allergies          Medications  Valid as of: February 24, 2017 - 10:47 AM    Generic Name Brand Name Tablet Size Instructions for use    Prenatal Multivit-Min-Fe-FA   Take  by mouth.        .                 Medicines prescribed today were sent to:     [facility] [address] [place] [address], [place] - [address] [place] [address]    Phone: [phone] Fax: [phone]    Open 24 Hours?: No      Medication refill instructions:       If your prescription bottle indicates you have medication refills left, it is not necessary to call your provider’s office. Please contact your pharmacy and they will refill your medication.    If your prescription bottle indicates you do not have any refills left, you may request refills at any time through one of the following ways: The online eelusion system (except Urgent Care), by calling your provider’s office, or by asking your pharmacy to contact your provider’s office with a refill request. Medication refills are processed only during regular business hours and may not be available until the next business day. Your provider may request additional information or to have a follow-up visit with you prior to refilling your medication.   *Please Note: Medication refills are assigned a new Rx number when refilled electronically. Your pharmacy may indicate that no refills were authorized even though a new prescription for the same medication is available at the pharmacy. Please request the medicine by name with the pharmacy before contacting your provider for a refill.        Other Notes About Your Plan     GIRL           MyChart Status: Patient Declined

## 2017-02-24 NOTE — PROGRESS NOTES
Pt here for OB/FU Reports Good Fetal Movement.  Pt c/o having U/Cs, denies any other complications   IOL scheduled for 2/25/17 @ 0800  GBS Neg  # [phone]

## 2017-02-25 ENCOUNTER — HOSPITAL ENCOUNTER (INPATIENT)
Facility: MEDICAL CENTER | Age: 27
LOS: 2 days | End: 2017-02-27
Attending: OBSTETRICS & GYNECOLOGY | Admitting: OBSTETRICS & GYNECOLOGY
Payer: MEDICAID

## 2017-02-25 LAB
BASOPHILS # BLD AUTO: 0.2 % (ref 0–1.8)
BASOPHILS # BLD: 0.01 K/UL (ref 0–0.12)
EOSINOPHIL # BLD AUTO: 0.04 K/UL (ref 0–0.51)
EOSINOPHIL NFR BLD: 0.7 % (ref 0–6.9)
ERYTHROCYTE [DISTWIDTH] IN BLOOD BY AUTOMATED COUNT: 50.1 FL (ref 35.9–50)
HCT VFR BLD AUTO: 38.8 % (ref 37–47)
HGB BLD-MCNC: 12.2 G/DL (ref 12–16)
HOLDING TUBE BB 8507: NORMAL
IMM GRANULOCYTES # BLD AUTO: 0.04 K/UL (ref 0–0.11)
IMM GRANULOCYTES NFR BLD AUTO: 0.7 % (ref 0–0.9)
LYMPHOCYTES # BLD AUTO: 1.46 K/UL (ref 1–4.8)
LYMPHOCYTES NFR BLD: 27.3 % (ref 22–41)
MCH RBC QN AUTO: 25.8 PG (ref 27–33)
MCHC RBC AUTO-ENTMCNC: 31.4 G/DL (ref 33.6–35)
MCV RBC AUTO: 82 FL (ref 81.4–97.8)
MONOCYTES # BLD AUTO: 0.57 K/UL (ref 0–0.85)
MONOCYTES NFR BLD AUTO: 10.7 % (ref 0–13.4)
NEUTROPHILS # BLD AUTO: 3.22 K/UL (ref 2–7.15)
NEUTROPHILS NFR BLD: 60.4 % (ref 44–72)
NRBC # BLD AUTO: 0 K/UL
NRBC BLD AUTO-RTO: 0 /100 WBC
PLATELET # BLD AUTO: 171 K/UL (ref 164–446)
PMV BLD AUTO: 12.5 FL (ref 9–12.9)
RBC # BLD AUTO: 4.73 M/UL (ref 4.2–5.4)
WBC # BLD AUTO: 5.3 K/UL (ref 4.8–10.8)

## 2017-02-25 PROCEDURE — A9270 NON-COVERED ITEM OR SERVICE: HCPCS | Performed by: FAMILY MEDICINE

## 2017-02-25 PROCEDURE — 700102 HCHG RX REV CODE 250 W/ 637 OVERRIDE(OP): Performed by: FAMILY MEDICINE

## 2017-02-25 PROCEDURE — 85025 COMPLETE CBC W/AUTO DIFF WBC: CPT

## 2017-02-25 PROCEDURE — 700111 HCHG RX REV CODE 636 W/ 250 OVERRIDE (IP): Performed by: OBSTETRICS & GYNECOLOGY

## 2017-02-25 PROCEDURE — 770002 HCHG ROOM/CARE - OB PRIVATE (112)

## 2017-02-25 PROCEDURE — A9270 NON-COVERED ITEM OR SERVICE: HCPCS | Performed by: OBSTETRICS & GYNECOLOGY

## 2017-02-25 PROCEDURE — 36415 COLL VENOUS BLD VENIPUNCTURE: CPT

## 2017-02-25 PROCEDURE — 700102 HCHG RX REV CODE 250 W/ 637 OVERRIDE(OP): Performed by: OBSTETRICS & GYNECOLOGY

## 2017-02-25 RX ORDER — IBUPROFEN 600 MG/1
600 TABLET ORAL EVERY 6 HOURS PRN
Status: DISCONTINUED | OUTPATIENT
Start: 2017-02-25 | End: 2017-02-27 | Stop reason: HOSPADM

## 2017-02-25 RX ORDER — MISOPROSTOL 200 UG/1
800 TABLET ORAL
Status: DISCONTINUED | OUTPATIENT
Start: 2017-02-25 | End: 2017-02-26 | Stop reason: HOSPADM

## 2017-02-25 RX ORDER — OXYCODONE AND ACETAMINOPHEN 10; 325 MG/1; MG/1
1 TABLET ORAL EVERY 4 HOURS PRN
Status: DISCONTINUED | OUTPATIENT
Start: 2017-02-25 | End: 2017-02-27 | Stop reason: HOSPADM

## 2017-02-25 RX ORDER — SODIUM CHLORIDE, SODIUM LACTATE, POTASSIUM CHLORIDE, CALCIUM CHLORIDE 600; 310; 30; 20 MG/100ML; MG/100ML; MG/100ML; MG/100ML
INJECTION, SOLUTION INTRAVENOUS CONTINUOUS
Status: DISCONTINUED | OUTPATIENT
Start: 2017-02-25 | End: 2017-02-27 | Stop reason: HOSPADM

## 2017-02-25 RX ORDER — ALUMINA, MAGNESIA, AND SIMETHICONE 2400; 2400; 240 MG/30ML; MG/30ML; MG/30ML
30 SUSPENSION ORAL EVERY 6 HOURS PRN
Status: DISCONTINUED | OUTPATIENT
Start: 2017-02-25 | End: 2017-02-26 | Stop reason: HOSPADM

## 2017-02-25 RX ORDER — OXYCODONE HYDROCHLORIDE AND ACETAMINOPHEN 5; 325 MG/1; MG/1
1 TABLET ORAL EVERY 4 HOURS PRN
Status: DISCONTINUED | OUTPATIENT
Start: 2017-02-25 | End: 2017-02-27 | Stop reason: HOSPADM

## 2017-02-25 RX ORDER — OXYTOCIN 10 [USP'U]/ML
10 INJECTION, SOLUTION INTRAMUSCULAR; INTRAVENOUS
Status: DISCONTINUED | OUTPATIENT
Start: 2017-02-25 | End: 2017-02-26 | Stop reason: HOSPADM

## 2017-02-25 RX ORDER — SODIUM CHLORIDE, SODIUM LACTATE, POTASSIUM CHLORIDE, CALCIUM CHLORIDE 600; 310; 30; 20 MG/100ML; MG/100ML; MG/100ML; MG/100ML
INJECTION, SOLUTION INTRAVENOUS
Status: ACTIVE
Start: 2017-02-25 | End: 2017-02-25

## 2017-02-25 RX ADMIN — SODIUM CHLORIDE, POTASSIUM CHLORIDE, SODIUM LACTATE AND CALCIUM CHLORIDE: 600; 310; 30; 20 INJECTION, SOLUTION INTRAVENOUS at 09:30

## 2017-02-25 RX ADMIN — SODIUM CHLORIDE, POTASSIUM CHLORIDE, SODIUM LACTATE AND CALCIUM CHLORIDE: 600; 310; 30; 20 INJECTION, SOLUTION INTRAVENOUS at 18:56

## 2017-02-25 RX ADMIN — MISOPROSTOL 25 MCG: 100 TABLET ORAL at 15:09

## 2017-02-25 RX ADMIN — MISOPROSTOL 25 MCG: 100 TABLET ORAL at 11:03

## 2017-02-25 ASSESSMENT — LIFESTYLE VARIABLES
ALCOHOL_USE: NO
DO YOU DRINK ALCOHOL: NO
EVER_SMOKED: NEVER

## 2017-02-25 NOTE — IP AVS SNAPSHOT
Home Care Instructions                                                                                                                Dulce Pineda   MRN: [id number]    Department:  POST PARTUM 31   2017           Follow-up Information     1. Follow up with Kerrie Carrizales M.D. In 2 days.    Specialty:  OB/Gyn    Why:  NST    Contact information    [address] [facility]  [place] [address]  [phone]          2. Follow up In 5 weeks.       I assume responsibility for securing a follow-up  Screening blood test on my baby within the specified date range.    -                  Discharge Instructions       POSTPARTUM DISCHARGE INSTRUCTIONS FOR MOM    YOB: 1990   Age: 26 y.o.               Admit Date: 2017     Discharge Date: 2017  Attending Doctor:  Fozia Lee M.D.                  Allergies:  Review of patient's allergies indicates no known allergies.    Discharged to {DISCHARGE TO:299981} by {TRANSPORTATION:506941}. Discharged via {DISCHARGED VIA:969213}, hospital escort: {HOSPITAL ESCORT:280663}.  Special equipment needed: {SPECIAL EQUIPMENT:952462}  Belongings with: {BELONGINGS WITH PATIENT/FAMILY:944863}  Be sure to schedule a follow-up appointment with your primary care doctor or any specialists as instructed.     Discharge Plan:   Diet Plan: Discussed  Activity Level: Discussed  Confirmed Follow up Appointment: Patient to Call and Schedule Appointment  Influenza Vaccine Indication: Indicated: 9 to 64 years of age  Influenza Vaccine Given - only chart on this line when given: Influenza Vaccine Given (See MAR)    REASONS TO CALL YOUR OBSTETRICIAN:  1.   Persistent fever or shaking chills (Temperature higher than 100.4)  2.   Heavy bleeding (soaking more than 1 pad per hour); Passing clots  3.   Foul odor from vagina  4.   Mastitis (Breast infection; breast pain, chills, fever, redness)  5.   Urinary pain, burning or frequency  6.   Episiotomy infection  7.   Abdominal  "incision infection  8.   Severe depression longer than 24 hours    HAND WASHING  · Prior to handling the baby.  · Before breastfeeding or bottle feeding baby.  · After using the bathroom or changing the baby's diaper.      VAGINAL CARE  · Nothing inside vagina for 6 weeks: no sexual intercourse, tampons or douching.  · Bleeding may continue for 2-4 weeks.  Amount may vary.    · Call your physician for heavy bleeding which means soaking more than 1 pad per hour    BIRTH CONTROL  · It is possible to become pregnant at any time after delivery and while breastfeeding.  · Plan to discuss a method of birth control with your physician at your follow up visit. visit.    DIET AND ELIMINATION  · Eating more fiber (bran cereal, fruits, and vegetables) and drinking plenty of fluids will help to avoid constipation.  · Urinary frequency after childbirth is normal.    POSTPARTUM BLUES  During the first few days after birth, you may experience a sense of the \"blues\" which may include impatience, irritability or even crying.  These feeling come and go quickly.  However, as many as 1 in 10 women experience emotional symptoms known as postpartum depression.    Postpartum depression:  May start as early as the second or third day after delivery or take several weeks or months to develop.  Symptoms of \"blues\" are present, but are more intense:  Crying spells; loss of appetite; feelings of hopelessness or loss of control; fear of touching the baby; over concern or no concern at all about the baby; little or no concern about your own appearance/caring for yourself; and/or inability to sleep or excessive sleeping.  Contact your physician if you are experiencing any of these symptoms.    Crisis Hotline:  · [place] Crisis Hotline:  6-472-NDNXWSY  Or 1-510.116.2558  · [place] Crisis Hotline:  1-409.433.2581  Or 124-084-0850    PREVENTING SHAKEN BABY:  If you are angry or stressed, PUT THE BABY IN THE CRIB, step away, take some deep breaths, " "and wait until you are calm to care for the baby.  DO NOT SHAKE THE BABY.  You are not alone, call a supporter for help.    · Crisis Call Center 24/7 crisis line 738-886-4780 or 1-770.870.1602  · You can also text them, text \"ANSWER\" to 054743    QUIT SMOKING/TOBACCO USE:  I understand the use of any tobacco products increases my chance of suffering from future heart disease and could cause other illnesses which may shorten my life. Quitting the use of tobacco products is the single most important thing I can do to improve my health. For further information on smoking / tobacco cessation call a Toll Free Quit Line at 1-302.435.7371 (*National Cancer [place]) or 1-317.218.2009 (American Lung Association) or you can access the web based program at www.lungusa.org.    · [place] Tobacco Users Help Line:  (242) 854-4140       Toll Free: 1-332.738.1482  · Quit Tobacco Program [facility] (013)706-7780    DEPRESSION / SUICIDE RISK:  As you are discharged from this [facility], it is important to learn how to keep safe from harming yourself.    Recognize the warning signs:  · Abrupt changes in personality, positive or negative- including increase in energy   · Giving away possessions  · Change in eating patterns- significant weight changes-  positive or negative  · Change in sleeping patterns- unable to sleep or sleeping all the time   · Unwillingness or inability to communicate  · Depression  · Unusual sadness, discouragement and loneliness  · Talk of wanting to die  · Neglect of personal appearance   · Rebelliousness- reckless behavior  · Withdrawal from people/activities they love  · Confusion- inability to concentrate     If you or a loved one observes any of these behaviors or has concerns about self-harm, here's what you can do:  · Talk about it- your feelings and reasons for harming yourself  · Remove any means that you might use to hurt yourself (examples: pills, rope, extension " cords, firearm)  · Get professional help from the community (Mental Health, Substance Abuse, psychological counseling)  · Do not be alone:Call your Safe Contact- someone whom you trust who will be there for you.  · Call your local CRISIS HOTLINE 993-4205 or 825-545-5023  · Call your local Children's Mobile Crisis Response Team [place] (822) 029-6730 or www.Centrix  · Call the toll free National Suicide Prevention Hotlines   · National Suicide Prevention Lifeline 303-619-UVJV (3509)  · National Hope Line Network 800-SUICIDE (112-2504)    DISCHARGE SURVEY:  Thank you for choosing [facility].  We hope we provided you with very good care.  You may be receiving a survey in the mail.  Please fill it out.  Your opinion is valuable to us.    ADDITIONAL EDUCATIONAL MATERIALS GIVEN TO PATIENT:        My signature on this form indicates that:  1.  I have reviewed and understand the above information  2.  My questions regarding this information have been answered to my satisfaction.  3.  I have formulated a plan with my discharge nurse to obtain my prescribed medication for home.         Discharge Medication Instructions:    Below are the medications your physician expects you to take upon discharge:    Review all your home medications and newly ordered medications with your doctor and/or pharmacist. Follow medication instructions as directed by your doctor and/or pharmacist.    Please keep your medication list with you and share with your physician.               Medication List      START taking these medications        Instructions    oxycodone-acetaminophen 5-325 MG Tabs   Last time this was given:  1 Tab on 2/26/2017  9:45 PM   Commonly known as:  PERCOCET    Take 1 Tab by mouth every four hours as needed (for Moderate Pain (Pain Scale 4-6) after delivery).   Dose:  1 Tab         CONTINUE taking these medications        Instructions    PRENATAL VITAMINS PO    Take  by mouth.               Crisis Hotline:      [place] Crisis Hotline:  4-583-RBJKZOV or 1-190.829.5331    [place] Crisis Hotline:    1-376.979.5841 or 638-897-4733        Disclaimer           _____________________________________                     __________       ________       Patient/Mother Signature or Legal                          Date                   Time

## 2017-02-25 NOTE — IP AVS SNAPSHOT
2/27/2017          Dulce Pineda  [address] [place] [address]    Dear Dulce:    [facility] wants to ensure your discharge home is safe and you or your loved ones have had all your questions answered regarding your care after you leave the hospital.    You may receive a telephone call within two days of your discharge.  This call is to make certain you understand your discharge instructions as well as ensure we provided you with the best care possible during your stay with us.     The call will only last approximately 3-5 minutes and will be done by a nurse.    Once again, we want to ensure your discharge home is safe and that you have a clear understanding of any next steps in your care.  If you have any questions or concerns, please do not hesitate to contact us, we are here for you.  Thank you for choosing [facility] for your healthcare needs.    Sincerely,    Jax Baum    [facility]

## 2017-02-25 NOTE — PROGRESS NOTES
Patient presents at 39.0 weeks for IOL due to unexplained abnormal AFP. Patient was placed on the monitor, vitals taken, and admit complete. SVE closed, ultrasound done by Dr. Isbell to confirm presentation. POC reviewed with patient, questions answered, needs met at this time.  0950 SVE closed, orders for cytotec. Patient educated on laying flat for one hour after placement. Patient did not want to void prior to placement.   1510 Dr. Lee bedside, SVE closed, cytotec placed (see MAR)

## 2017-02-25 NOTE — PROGRESS NOTES
"S: Pt comfortable, having some contractions.    O:/Blood pressure 119/69, pulse 88, height 1.778 m (5' 10\"), weight 105.235 kg (232 lb), last menstrual period 2016.     [place] - irregular contractions  EFM - 140s, moderate variability, + accels  Cx - unable to reach, very posterior    Dose #2 of cytotec 25 mcg placed per vagina.    A/P: 25 yo  @ 39+0/7 wks admitted for IOL due to unexplained elevated AFP.  Cervix very unfavorable.  Fetal status reassuring.  Continue cervical ripening.   "

## 2017-02-25 NOTE — H&P
History and Physical      Dulce Pineda is a 26 y.o. year old female  at 39w0d by LMP c/w US at 20w4d who presents for induction of labor due to unexplained elevated AFP.    Subjective:   positive  For CTXS - irregular   negative Feels pain   negative for LOF  negative for vaginal bleeding.   positive for fetal movement    Patient's last menstrual period was 2016.  3/4/2017, by Last Menstrual Period    ROS: Patient denies fever, chills, nausea, vomiting , headache, visual disturbance, or dysuria.    History reviewed. No pertinent past medical history.  History reviewed. No pertinent past surgical history.  OB History    Para Term  AB SAB TAB Ectopic Multiple Living   4 2 2  1 1    2      # Outcome Date GA Lbr [place]/2nd Weight Sex Delivery Anes PTL Lv   4 Current            3 Term 14 40w0d  3.5 kg (7 lb 11.5 oz) M Vag-Spont None N Y   2 Term 12 40w0d  3.9 kg (8 lb 9.6 oz) F Vag-Spont None N Y   1 SAB  4w0d               Social History     Social History   • Marital Status:      Spouse Name: N/A   • Number of Children: N/A   • Years of Education: N/A     Occupational History   • Not on file.     Social History Main Topics   • Smoking status: Never Smoker    • Smokeless tobacco: Not on file   • Alcohol Use: Yes      Comment: occasionally not during pregnancy   • Drug Use: No   • Sexual Activity:     Partners: Male      Comment: Has never used birth control     Other Topics Concern   • Not on file     Social History Narrative     Allergies: Review of patient's allergies indicates no known allergies.    Current facility-administered medications:   •  LACTATED RINGERS IV SOLN, , , ,   •  fentaNYL (SUBLIMAZE) injection 100 mcg, 100 mcg, Intravenous, Q HOUR PRN, Zehra Isbell M.D.  •  fentaNYL (SUBLIMAZE) injection 50 mcg, 50 mcg, Intravenous, Q HOUR PRN, Zehra Isbell M.D.  •  mag hydrox-al hydrox-simeth (MAALOX PLUS ES or MYLANTA DS) suspension 30 mL, 30 mL,  "Oral, Q6HRS PRN, Zehra Isbell M.D.  •  oxytocin (PITOCIN) injection 10 Units, 10 Units, Intramuscular, Once PRN, Zehra Isbell M.D.  •  misoprostol (CYTOTEC) tablet 800 mcg, 800 mcg, Rectal, Once PRN, Zehra Isbell M.D.  •  misoprostol (CYTOTEC) tablet 25 mcg, 25 mcg, Vaginal, Once, Zehra Isbell M.D.    Prenatal care with [facility] starting at 13w6d with following problems:  Patient Active Problem List    Diagnosis Date Noted   • Indication for care in labor or delivery 02/25/2017   • Abnormal quad screen 11/17/2016   • Supervision of other normal pregnancy, antepartum 09/02/2016               Objective:      Height 1.778 m (5' 10\"), weight 105.235 kg (232 lb), last menstrual period 05/28/2016.    General:   Afebrile, NAD   Skin:   No rash or lesion   HEENT:  NCAT, EOMI, non-icteric, mmm   Lungs:   CTAB   Heart:   RRR, NMGR   Abdomen:   gravid, NT   EFW:  3500g   Pelvis:  adequate with gynecoid pelvis   FHT:  130s/accels to 165/mod variability/no decels    Uterine Size: S=D   Presentations: Cephalic   Cervix:     Dilation: Closed    Effacement: Long    Station:  -3    Consistency: unknown    Position: Posterior     Lab Review  Lab:   Blood type: A     Recent Results (from the past 5880 hour(s))   POCT Pregnancy    Collection Time: 08/30/16 12:16 PM   Result Value Ref Range    POC Urine Pregnancy Test Positive Negative    Internal Control Positive Positive     Internal Control Negative Negative    THINPREP RFLX HPV ASCUS W/CTNG    Collection Time: 09/02/16  3:52 PM   Result Value Ref Range    Cytology Reg See Path Report     Source Endo/Cervical     C. trachomatis by PCR Negative Negative    N. gonorrhoeae by PCR Negative Negative   HEMOGLOBINOPATHY PROFILE    Collection Time: 09/02/16  4:56 PM   Result Value Ref Range    Hemoglobin A1 96.9 95.0 - 97.9 %    Hemoglobin A2 2.7 2.0 - 3.5 %    Hemoglobin F 0.4 0.0 - 2.1 %    Hemoglobin S 0.0 0.0 - 0.0 %    Hemaglobin C 0.0 0.0 - 0.0 %    Hemoglobin E 0.0 " 0.0 - 0.0 %    Hemoglobin Other 0.0 0.0 - 0.0 %    Hemoglobin Eval See Note     Hemoglobin,Capillary Electrophoresis Not Performed     Hgb Solubility Not Performed    PREG CNTR PRENATAL PN    Collection Time: 09/02/16  4:56 PM   Result Value Ref Range    WBC 10.5 4.8 - 10.8 K/uL    RBC 4.76 4.20 - 5.40 M/uL    Hemoglobin 13.7 12.0 - 16.0 g/dL    Hematocrit 42.4 37.0 - 47.0 %    MCV 89.1 81.4 - 97.8 fL    MCH 28.8 27.0 - 33.0 pg    MCHC 32.3 (L) 33.6 - 35.0 g/dL    RDW 47.7 35.9 - 50.0 fL    Platelet Count 140 (L) 164 - 446 K/uL    MPV 13.6 (H) 9.0 - 12.9 fL    Neutrophils-Polys 68.90 44.00 - 72.00 %    Lymphocytes 22.60 22.00 - 41.00 %    Monocytes 6.30 0.00 - 13.40 %    Eosinophils 0.80 0.00 - 6.90 %    Basophils 0.40 0.00 - 1.80 %    Immature Granulocytes 1.00 (H) 0.00 - 0.90 %    Nucleated RBC 0.00 /100 WBC    Neutrophils (Absolute) 7.23 (H) 2.00 - 7.15 K/uL    Lymphs (Absolute) 2.37 1.00 - 4.80 K/uL    Monos (Absolute) 0.66 0.00 - 0.85 K/uL    Eos (Absolute) 0.08 0.00 - 0.51 K/uL    Baso (Absolute) 0.04 0.00 - 0.12 K/uL    Immature Granulocytes (abs) 0.11 0.00 - 0.11 K/uL    NRBC (Absolute) 0.00 K/uL    Micro Urine Req Microscopic     Color Lt. Yellow     Character Sl Cloudy (A)     Specific Gravity 1.014 <1.035    Ph 6.0 5.0-8.0    Glucose Negative Negative mg/dL    Ketones Negative Negative mg/dL    Protein Negative Negative mg/dL    Bilirubin Negative Negative    Nitrite Negative Negative    Leukocyte Esterase Negative Negative    Occult Blood Negative Negative    Culture Indicated No UA Culture    Rubella IgG Antibody 35.00 IU/mL    Syphilis, Treponemal Qual Non Reactive Non Reactive    Hepatitis B Surface Antigen Negative Negative   HIV ANTIBODIES    Collection Time: 09/02/16  4:56 PM   Result Value Ref Range    HIV Ag/Ab Combo Assay Non Reactive Non Reactive   OP PRENATAL PANEL-BLOOD BANK    Collection Time: 09/02/16  4:56 PM   Result Value Ref Range    ABO Grouping Only A     Rh Grouping Only POS      Antibody Screen Scrn NEG    URINE MICROSCOPIC (W/UA)    Collection Time: 09/02/16  4:56 PM   Result Value Ref Range    WBC 0-2 /hpf    RBC 2-5 (A) /hpf    Bacteria Few (A) None /hpf    Epithelial Cells Few /hpf    Ca Oxalate Crystal Few /hpf   AFP TETRA    Collection Time: 10/19/16 10:34 AM   Result Value Ref Range    AFP Value -Eia 24 ng/mL    AFP MOM Value 0.47     Hcg Value 33923 IU/L    Hcg Mom 1.10     Ue3 Value 1.95 ng/mL    Ue3 Mom 0.97     Interpretation Abnormal (A)     Maternal [age] JESSICA 28[age]5 yr    Gestational Age Based On LNMP     Gestational Age 20.57 weeks    Insulin Dependent Diabetes No     Race Black     Multiple Pregnancy One     Concetta Value -Eia 244 pg/mL    Concetta Mom Value 1.68     Maternal Weight 225 lbs    Err Maternal Scrn AFP See Note    PRENATAL TESTING ANEUPLOIDY    Collection Time: 11/15/16  4:36 PM   Result Value Ref Range    Gestation Age 24     Gestational Age at Draw (days) 2     Report Fetal Sex? Yes     Trisomy 21 Low risk     Trisomy 18 Low risk     Trisomy 13 Low risk     Monosomy X Low risk     Triploidy/Vanishing Twin Low risk     Fetal Sex Female     Fetal Fraction 11.0     Result Summary Low risk     EER Non-Invasive Prenatal, Aneuploidy See Note     Maternal Weight 235    GLUCOSE 1HR GESTATIONAL    Collection Time: 12/20/16 11:29 AM   Result Value Ref Range    Glucose, Post Dose 164 (H) 70 - 139 mg/dL   HGB    Collection Time: 12/20/16 11:29 AM   Result Value Ref Range    Hemoglobin 11.4 (L) 12.0 - 16.0 g/dL   HCT    Collection Time: 12/20/16 11:29 AM   Result Value Ref Range    Hematocrit 35.7 (L) 37.0 - 47.0 %   T.PALLIDUM AB EIA    Collection Time: 12/20/16 11:29 AM   Result Value Ref Range    Syphilis, Treponemal Qual Non Reactive Non Reactive   GLUCOSE 3 HR GESTATIONAL    Collection Time: 12/28/16 10:53 AM   Result Value Ref Range    Baseline Glucose 80 65 - 95 mg/dL    Glucose 1 Hour 162 65 - 180 mg/dL    Glucose 3 Hour 125 65 - 140 mg/dL    Glucose 2 Hour 182 (H) 65 - 155  mg/dL   Fetal Nonstress Test    Collection Time: 01/25/17  3:50 PM   Result Value Ref Range    NST Indications abnormal AFP tetra     NST Baseline 150     NST Uterine Activity rare contractions     NST Acoustic Stimulation ×1     NST Assessment reactive     NST Action Necessary none     NST Other Data      NST Return      NST Read By     POCT NST    Collection Time: 01/31/17 10:17 AM   Result Value Ref Range    NST Indications abnormal AFP     NST Baseline 145     NST Uterine Activity none     NST Acoustic Stimulation none     NST Assessment       Category one, pos accels, no decels, moderate variability    NST Action Necessary      NST Other Data      NST Return      NST Read By     GRP B STREP, BY PCR (PEREZ BROTH)    Collection Time: 01/31/17 11:12 AM   Result Value Ref Range    Strep Gp B DNA PCR Negative Negative   POCT Fetal Nonstress Test    Collection Time: 02/03/17 10:32 AM   Result Value Ref Range    NST Indications abnormal AFP     NST Baseline 145     NST Uterine Activity no     NST Acoustic Stimulation yes     NST Assessment reactive     NST Action Necessary      NST Other Data      NST Return      NST Read By Naldo    POCT Fetal Nonstress Test    Collection Time: 02/07/17 10:23 AM   Result Value Ref Range    NST Indications Abnroaml AFP     NST Baseline 150     NST Uterine Activity no     NST Acoustic Stimulation no     NST Assessment reactive     NST Action Necessary      NST Other Data      NST Return      NST Read By Naldo    POCT NST    Collection Time: 02/10/17  9:04 AM   Result Value Ref Range    NST Indications abn quad     NST Baseline 130     NST Uterine Activity one     NST Acoustic Stimulation no     NST Assessment reactive     NST Action Necessary      NST Other Data      NST Return      NST Read By     POCT Fetal Nonstress Test    Collection Time: 02/14/17 10:08 AM   Result Value Ref Range    NST Indications Abnormal AFP     NST Baseline 140     NST Uterine Activity x1 CTX in 20 min strip      NST Acoustic Stimulation none     NST Assessment reactive     NST Action Necessary none     NST Other Data moderate variability no decels     NST Return      NST Read By ARYA Mendiola    POCT NST    Collection Time: 17 11:10 AM   Result Value Ref Range    NST Indications Abnormal AFP     NST Baseline      NST Uterine Activity      NST Acoustic Stimulation      NST Assessment category1     NST Action Necessary      NST Other Data      NST Return      NST Read By charley    POCT NST    Collection Time: 17  8:44 AM   Result Value Ref Range    NST Indications unexplained elevated AFP     NST Baseline 140s     NST Uterine Activity irregular ctx     NST Acoustic Stimulation vas     NST Assessment Cat 1     NST Action Necessary      NST Other Data      NST Return      NST Read By Rosa         Assessment:   Dulce Pineda 26 y.o. year old female  at 39w0d who presents for induction of labor due to unexplained elevated AFP.  Labor status: Not in labor.  GBS negative  Obstetrical history significant for   Patient Active Problem List    Diagnosis Date Noted   • Indication for care in labor or delivery 2017   • Abnormal quad screen 2016   • Supervision of other normal pregnancy, antepartum 2016   .      Plan:     - Admit to L&D  - GBS neg  - IOL for elevated AFP  - Cervix unfavorable, induce with Cytotec, followed by pit and AROM as patient progresses  - Routine labs  - Pain management  - Anticipate

## 2017-02-25 NOTE — IP AVS SNAPSHOT
EUROBOX Access Code: Activation code not generated  Current EUROBOX Status: Patient Declined    Your email address is not on file at [facility].  Email Addresses are required for you to sign up for EUROBOX, please contact [phone] to verify your personal information and to provide your email address prior to attempting to register for EUROBOX.    Dulce Corley Maria Tcassandra  [address] [place] [address]    Envysiont  A secure, online tool to manage your health information     [facility]’s EUROBOX® is a secure, online tool that connects you to your personalized health information from the privacy of your home -- day or night - making it very easy for you to manage your healthcare. Once the activation process is completed, you can even access your medical information using the EUROBOX diamond, which is available for free in the Apple Diamond store or Google Play store.     To learn more about EUROBOX, visit [email or web address]    There are two levels of access available (as shown below):   My Chart Features  [facility] Primary Care Doctor [facility]  Specialists [facility]  Urgent  Care Non-[facility] Primary Care Doctor   Email your healthcare team securely and privately 24/7 X X X    Manage appointments: schedule your next appointment; view details of past/upcoming appointments X      Request prescription refills. X      View recent personal medical records, including lab and immunizations X X X X   View health record, including health history, allergies, medications X X X X   Read reports about your outpatient visits, procedures, consult and ER notes X X X X   See your discharge summary, which is a recap of your hospital and/or ER visit that includes your diagnosis, lab results, and care plan X X  X     How to register for Envysiont:  Once your e-mail address has been verified, follow the following steps to sign up for Envysiont.     1. Go to  [email or web address]  2. Click on the Sign Up Now box, which takes you to the New  Member Sign Up page. You will need to provide the following information:  a. Enter your [employer] Access Code exactly as it appears at the top of this page. (You will not need to use this code after you’ve completed the sign-up process. If you do not sign up before the expiration date, you must request a new code.)   b. Enter your date of birth.   c. Enter your home email address.   d. Click Submit, and follow the next screen’s instructions.  3. Create a Ipracomt ID. This will be your [employer] login ID and cannot be changed, so think of one that is secure and easy to remember.  4. Create a [employer] password. You can change your password at any time.  5. Enter your Password Reset Question and Answer. This can be used at a later time if you forget your password.   6. Enter your e-mail address. This allows you to receive e-mail notifications when new information is available in [employer].  7. Click Sign Up. You can now view your health information.    For assistance activating your [employer] account, call [phone]

## 2017-02-26 LAB — A1 MICROGLOB PLACENTAL VAG QL: POSITIVE

## 2017-02-26 PROCEDURE — 84112 EVAL AMNIOTIC FLUID PROTEIN: CPT

## 2017-02-26 PROCEDURE — A9270 NON-COVERED ITEM OR SERVICE: HCPCS | Performed by: FAMILY MEDICINE

## 2017-02-26 PROCEDURE — 700101 HCHG RX REV CODE 250

## 2017-02-26 PROCEDURE — 700111 HCHG RX REV CODE 636 W/ 250 OVERRIDE (IP)

## 2017-02-26 PROCEDURE — 770002 HCHG ROOM/CARE - OB PRIVATE (112)

## 2017-02-26 PROCEDURE — 700102 HCHG RX REV CODE 250 W/ 637 OVERRIDE(OP): Performed by: FAMILY MEDICINE

## 2017-02-26 PROCEDURE — 700111 HCHG RX REV CODE 636 W/ 250 OVERRIDE (IP): Performed by: OBSTETRICS & GYNECOLOGY

## 2017-02-26 PROCEDURE — 59409 OBSTETRICAL CARE: CPT

## 2017-02-26 PROCEDURE — 304965 HCHG RECOVERY SERVICES

## 2017-02-26 PROCEDURE — 10907ZC DRAINAGE OF AMNIOTIC FLUID, THERAPEUTIC FROM PRODUCTS OF CONCEPTION, VIA NATURAL OR ARTIFICIAL OPENING: ICD-10-PCS | Performed by: OBSTETRICS & GYNECOLOGY

## 2017-02-26 PROCEDURE — 303615 HCHG EPIDURAL/SPINAL ANESTHESIA FOR LABOR

## 2017-02-26 PROCEDURE — 700111 HCHG RX REV CODE 636 W/ 250 OVERRIDE (IP): Performed by: PHYSICIAN ASSISTANT

## 2017-02-26 PROCEDURE — 700111 HCHG RX REV CODE 636 W/ 250 OVERRIDE (IP): Performed by: FAMILY MEDICINE

## 2017-02-26 PROCEDURE — 36415 COLL VENOUS BLD VENIPUNCTURE: CPT

## 2017-02-26 PROCEDURE — 3E033VJ INTRODUCTION OF OTHER HORMONE INTO PERIPHERAL VEIN, PERCUTANEOUS APPROACH: ICD-10-PCS | Performed by: OBSTETRICS & GYNECOLOGY

## 2017-02-26 RX ORDER — DOCUSATE SODIUM 100 MG/1
100 CAPSULE, LIQUID FILLED ORAL 2 TIMES DAILY PRN
Status: DISCONTINUED | OUTPATIENT
Start: 2017-02-26 | End: 2017-02-27 | Stop reason: HOSPADM

## 2017-02-26 RX ORDER — CARBOPROST TROMETHAMINE 250 UG/ML
250 INJECTION, SOLUTION INTRAMUSCULAR
Status: DISCONTINUED | OUTPATIENT
Start: 2017-02-26 | End: 2017-02-27 | Stop reason: HOSPADM

## 2017-02-26 RX ORDER — METHYLERGONOVINE MALEATE 0.2 MG/ML
0.2 INJECTION INTRAVENOUS
Status: DISCONTINUED | OUTPATIENT
Start: 2017-02-26 | End: 2017-02-27 | Stop reason: HOSPADM

## 2017-02-26 RX ORDER — ROPIVACAINE HYDROCHLORIDE 2 MG/ML
INJECTION, SOLUTION EPIDURAL; INFILTRATION; PERINEURAL
Status: COMPLETED
Start: 2017-02-26 | End: 2017-02-26

## 2017-02-26 RX ORDER — SODIUM CHLORIDE, SODIUM LACTATE, POTASSIUM CHLORIDE, CALCIUM CHLORIDE 600; 310; 30; 20 MG/100ML; MG/100ML; MG/100ML; MG/100ML
INJECTION, SOLUTION INTRAVENOUS PRN
Status: DISCONTINUED | OUTPATIENT
Start: 2017-02-26 | End: 2017-02-27 | Stop reason: HOSPADM

## 2017-02-26 RX ORDER — BUPIVACAINE HYDROCHLORIDE 2.5 MG/ML
INJECTION, SOLUTION EPIDURAL; INFILTRATION; INTRACAUDAL
Status: ACTIVE
Start: 2017-02-26 | End: 2017-02-27

## 2017-02-26 RX ORDER — ONDANSETRON 4 MG/1
4 TABLET, ORALLY DISINTEGRATING ORAL EVERY 6 HOURS PRN
Status: DISCONTINUED | OUTPATIENT
Start: 2017-02-26 | End: 2017-02-27 | Stop reason: HOSPADM

## 2017-02-26 RX ORDER — ONDANSETRON 2 MG/ML
4 INJECTION INTRAMUSCULAR; INTRAVENOUS EVERY 6 HOURS PRN
Status: DISCONTINUED | OUTPATIENT
Start: 2017-02-26 | End: 2017-02-27 | Stop reason: HOSPADM

## 2017-02-26 RX ADMIN — Medication 1 MILLI-UNITS/MIN: at 01:02

## 2017-02-26 RX ADMIN — SODIUM CHLORIDE, POTASSIUM CHLORIDE, SODIUM LACTATE AND CALCIUM CHLORIDE: 600; 310; 30; 20 INJECTION, SOLUTION INTRAVENOUS at 15:52

## 2017-02-26 RX ADMIN — IBUPROFEN 600 MG: 600 TABLET, FILM COATED ORAL at 21:45

## 2017-02-26 RX ADMIN — ROPIVACAINE HYDROCHLORIDE 200 MG: 2 INJECTION, SOLUTION EPIDURAL; INFILTRATION at 16:46

## 2017-02-26 RX ADMIN — Medication 125 ML/HR: at 20:05

## 2017-02-26 RX ADMIN — Medication 1 MILLI-UNITS/MIN: at 09:56

## 2017-02-26 RX ADMIN — SODIUM CHLORIDE, POTASSIUM CHLORIDE, SODIUM LACTATE AND CALCIUM CHLORIDE: 600; 310; 30; 20 INJECTION, SOLUTION INTRAVENOUS at 09:41

## 2017-02-26 RX ADMIN — OXYCODONE HYDROCHLORIDE AND ACETAMINOPHEN 1 TABLET: 5; 325 TABLET ORAL at 21:45

## 2017-02-26 ASSESSMENT — PAIN SCALES - GENERAL
PAINLEVEL_OUTOF10: 6
PAINLEVEL_OUTOF10: 3

## 2017-02-26 NOTE — PROGRESS NOTES
Received report and reassumed care of patient from TYSON Ziegler. POC was reviewed, questions answered and needs met at this time. Inna and Dr. Lee have spoken with Dr. Baker and have decided the patient should be discharged and rescheduled for induction on or around her due date. Inna bedside to discuss POC with patient and agrees to POC. Instructions were given to monitor kick counts and follow up with scheduled appointments until delivery. Upon entering the room to discharge patient she stated that she felt like her water may have broke when she stood up. Sterile speculum done and no gross pooling noted but vagina is very moist. Amnisure done as well. Results are positive.  0920 SVE 1/70/-3, instructions to watch contractions for a while and resume pitocin if necessary.  1345 SVE 1/70/-3  1500 Dr. Baker bedside SVE 3/50/-2, forebag ruptured  1630 Dr. Dick bedside for epidural  1830 SVE complete, will labor down before pushing  1900 report given to SHERIN Campbell and assumed care of patient.

## 2017-02-26 NOTE — PROGRESS NOTES
Pt breathing well through UCs. When asked if in pain, pt just breathes out. D/w pt plan to recheck cervix and make plan from there.     Cervix: Cl/th/floating, vtx by US today  NST: Cat 1  TOCO: UCs q 1-3min, palpate strong by RN    A/P: IUP at 39 wk - IOL for abnormal AFP, plan to place PGE gel, as pt has had 2 cytotec without dilation, and unable to place Cooks catheter at this time due to fetal station and closed cervix. Unable to place gel at this time due to frequency of UCs. Will wait and give IV fluids now.

## 2017-02-26 NOTE — PROGRESS NOTES
1855: Report received from LAITH Price RN. POC discussed with pt. Encouraged pt to call with needs.   2032: ISHMAEL NUÑEZ At bedside. SVE by provider. Closed/50/floating. Provider wanting to observe pt's UC pattern and then have pt ambulate on unit for 2 hours.   [id number]: Pt off monitors and ambulating on unit  2335: ISHMAEL NUÑEZ At bedside. SVE by provider. Closed/75/-3. Provider ordering for oxytocin to be started.   0542: ISHMAEL NUÑEZ At bedside. SVE by provider. Provider unable to assess cervical dilation. Provider ordered for oxytocin to be stopped.   0700: Report given to LAITH Price RN. POC discussed with pt.

## 2017-02-27 VITALS
TEMPERATURE: 97.8 F | HEART RATE: 66 BPM | HEIGHT: 70 IN | WEIGHT: 232 LBS | DIASTOLIC BLOOD PRESSURE: 64 MMHG | RESPIRATION RATE: 16 BRPM | BODY MASS INDEX: 33.21 KG/M2 | SYSTOLIC BLOOD PRESSURE: 118 MMHG | OXYGEN SATURATION: 96 %

## 2017-02-27 LAB
ERYTHROCYTE [DISTWIDTH] IN BLOOD BY AUTOMATED COUNT: 50.2 FL (ref 35.9–50)
HCT VFR BLD AUTO: 37.2 % (ref 37–47)
HGB BLD-MCNC: 12.1 G/DL (ref 12–16)
MCH RBC QN AUTO: 26.6 PG (ref 27–33)
MCHC RBC AUTO-ENTMCNC: 32.5 G/DL (ref 33.6–35)
MCV RBC AUTO: 81.8 FL (ref 81.4–97.8)
PLATELET # BLD AUTO: 156 K/UL (ref 164–446)
PMV BLD AUTO: 11.9 FL (ref 9–12.9)
RBC # BLD AUTO: 4.55 M/UL (ref 4.2–5.4)
WBC # BLD AUTO: 8.5 K/UL (ref 4.8–10.8)

## 2017-02-27 PROCEDURE — 90471 IMMUNIZATION ADMIN: CPT

## 2017-02-27 PROCEDURE — 90686 IIV4 VACC NO PRSV 0.5 ML IM: CPT | Performed by: OBSTETRICS & GYNECOLOGY

## 2017-02-27 PROCEDURE — A9270 NON-COVERED ITEM OR SERVICE: HCPCS | Performed by: FAMILY MEDICINE

## 2017-02-27 PROCEDURE — 3E0234Z INTRODUCTION OF SERUM, TOXOID AND VACCINE INTO MUSCLE, PERCUTANEOUS APPROACH: ICD-10-PCS | Performed by: OBSTETRICS & GYNECOLOGY

## 2017-02-27 PROCEDURE — 700102 HCHG RX REV CODE 250 W/ 637 OVERRIDE(OP): Performed by: FAMILY MEDICINE

## 2017-02-27 PROCEDURE — 85027 COMPLETE CBC AUTOMATED: CPT

## 2017-02-27 PROCEDURE — 36415 COLL VENOUS BLD VENIPUNCTURE: CPT

## 2017-02-27 PROCEDURE — 700111 HCHG RX REV CODE 636 W/ 250 OVERRIDE (IP): Performed by: OBSTETRICS & GYNECOLOGY

## 2017-02-27 RX ORDER — OXYCODONE HYDROCHLORIDE AND ACETAMINOPHEN 5; 325 MG/1; MG/1
1 TABLET ORAL EVERY 4 HOURS PRN
Qty: 15 TAB | Refills: 0 | Status: ON HOLD | OUTPATIENT
Start: 2017-02-27 | End: 2018-01-22

## 2017-02-27 RX ADMIN — IBUPROFEN 600 MG: 600 TABLET, FILM COATED ORAL at 04:35

## 2017-02-27 RX ADMIN — INFLUENZA A VIRUS A/CALIFORNIA/7/2009 X-179A (H1N1) ANTIGEN (FORMALDEHYDE INACTIVATED), INFLUENZA A VIRUS A/HONG KONG/4801/2014 X-263B (H3N2) ANTIGEN (FORMALDEHYDE INACTIVATED), INFLUENZA B VIRUS B/PHUKET/3073/2013 ANTIGEN (FORMALDEHYDE INACTIVATED), AND INFLUENZA B VIRUS B/BRISBANE/60/2008 ANTIGEN (FORMALDEHYDE INACTIVATED) 0.5 ML: 15; 15; 15; 15 INJECTION, SUSPENSION INTRAMUSCULAR at 03:17

## 2017-02-27 RX ADMIN — IBUPROFEN 600 MG: 600 TABLET, FILM COATED ORAL at 13:15

## 2017-02-27 ASSESSMENT — PAIN SCALES - GENERAL
PAINLEVEL_OUTOF10: 3
PAINLEVEL_OUTOF10: 0
PAINLEVEL_OUTOF10: 7
PAINLEVEL_OUTOF10: 0
PAINLEVEL_OUTOF10: 3

## 2017-02-27 ASSESSMENT — LIFESTYLE VARIABLES: EVER_SMOKED: NEVER

## 2017-02-27 NOTE — CARE PLAN
Problem: Knowledge Deficit  Goal: Patient/Support person demonstrates understanding regarding the progression of labor, available options and participates in decision-making process  Outcome: PROGRESSING AS EXPECTED  Actively participating in care, questions encouraged and answered throughout shift.    Problem: Pain  Goal: Alleviation of Pain or a reduction in pain to the patient’s comfort goal  Outcome: PROGRESSING AS EXPECTED  Reports excellent pain relief with epidural analgesia.

## 2017-02-27 NOTE — DISCHARGE INSTRUCTIONS
POSTPARTUM DISCHARGE INSTRUCTIONS FOR MOM    YOB: 1990   Age: 26 y.o.               Admit Date: 2/25/2017     Discharge Date: 2/27/2017  Attending Doctor:  Fozia Lee M.D.                  Allergies:  Review of patient's allergies indicates no known allergies.    Discharged to home by car. Discharged via wheelchair, hospital escort: Yes.  Special equipment needed: Not Applicable  Belongings with: Personal  Be sure to schedule a follow-up appointment with your primary care doctor or any specialists as instructed.     Discharge Plan:   Diet Plan: Discussed  Activity Level: Discussed  Confirmed Follow up Appointment: Patient to Call and Schedule Appointment  Influenza Vaccine Indication: Indicated: 9 to 64 years of age  Influenza Vaccine Given - only chart on this line when given: Influenza Vaccine Given (See MAR)    REASONS TO CALL YOUR OBSTETRICIAN:  1.   Persistent fever or shaking chills (Temperature higher than 100.4)  2.   Heavy bleeding (soaking more than 1 pad per hour); Passing clots  3.   Foul odor from vagina  4.   Mastitis (Breast infection; breast pain, chills, fever, redness)  5.   Urinary pain, burning or frequency  6.   Episiotomy infection  7.   Abdominal incision infection  8.   Severe depression longer than 24 hours    HAND WASHING  · Prior to handling the baby.  · Before breastfeeding or bottle feeding baby.  · After using the bathroom or changing the baby's diaper.      VAGINAL CARE  · Nothing inside vagina for 6 weeks: no sexual intercourse, tampons or douching.  · Bleeding may continue for 2-4 weeks.  Amount may vary.    · Call your physician for heavy bleeding which means soaking more than 1 pad per hour    BIRTH CONTROL  · It is possible to become pregnant at any time after delivery and while breastfeeding.  · Plan to discuss a method of birth control with your physician at your follow up visit. visit.    DIET AND ELIMINATION  · Eating more fiber (bran cereal, fruits, and  "vegetables) and drinking plenty of fluids will help to avoid constipation.  · Urinary frequency after childbirth is normal.    POSTPARTUM BLUES  During the first few days after birth, you may experience a sense of the \"blues\" which may include impatience, irritability or even crying.  These feeling come and go quickly.  However, as many as 1 in 10 women experience emotional symptoms known as postpartum depression.    Postpartum depression:  May start as early as the second or third day after delivery or take several weeks or months to develop.  Symptoms of \"blues\" are present, but are more intense:  Crying spells; loss of appetite; feelings of hopelessness or loss of control; fear of touching the baby; over concern or no concern at all about the baby; little or no concern about your own appearance/caring for yourself; and/or inability to sleep or excessive sleeping.  Contact your physician if you are experiencing any of these symptoms.    Crisis Hotline:  · [place] Crisis Hotline:  8-489-KYTSDTO  Or 1-982.788.5569  · [place] Crisis Hotline:  1-215.535.6766  Or 666-209-5545    PREVENTING SHAKEN BABY:  If you are angry or stressed, PUT THE BABY IN THE CRIB, step away, take some deep breaths, and wait until you are calm to care for the baby.  DO NOT SHAKE THE BABY.  You are not alone, call a supporter for help.    · Crisis Call Center 24/7 crisis line 663-264-5399 or 1-325.291.8941  · You can also text them, text \"ANSWER\" to 233311    QUIT SMOKING/TOBACCO USE:  I understand the use of any tobacco products increases my chance of suffering from future heart disease and could cause other illnesses which may shorten my life. Quitting the use of tobacco products is the single most important thing I can do to improve my health. For further information on smoking / tobacco cessation call a Toll Free Quit Line at 1-303.845.5498 (*[employer] [place]) or 1-643.856.3486 (American Lung Association) or you can access the web " based program at www.lungusa.org.    · [place] Tobacco Users Help Line:  (419) 424-5806       Toll Free: 1-423.615.4687  · Quit Tobacco Program [facility] (283)016-9942    DEPRESSION / SUICIDE RISK:  As you are discharged from this [facility], it is important to learn how to keep safe from harming yourself.    Recognize the warning signs:  · Abrupt changes in personality, positive or negative- including increase in energy   · Giving away possessions  · Change in eating patterns- significant weight changes-  positive or negative  · Change in sleeping patterns- unable to sleep or sleeping all the time   · Unwillingness or inability to communicate  · Depression  · Unusual sadness, discouragement and loneliness  · Talk of wanting to die  · Neglect of personal appearance   · Rebelliousness- reckless behavior  · Withdrawal from people/activities they love  · Confusion- inability to concentrate     If you or a loved one observes any of these behaviors or has concerns about self-harm, here's what you can do:  · Talk about it- your feelings and reasons for harming yourself  · Remove any means that you might use to hurt yourself (examples: pills, rope, extension cords, firearm)  · Get professional help from the community (Mental Health, Substance Abuse, psychological counseling)  · Do not be alone:Call your Safe Contact- someone whom you trust who will be there for you.  · Call your local CRISIS HOTLINE 827-5338 or 723-192-5230  · Call your local Children's Mobile Crisis Response Team [place] (701) 519-3763 or www.[employer]  · Call the toll free National Suicide Prevention Hotlines   · National Suicide Prevention Lifeline 471-979-MKKK (0037)  · National Hope Line Network 800-SUICIDE (099-8075)    DISCHARGE SURVEY:  Thank you for choosing [facility].  We hope we provided you with very good care.  You may be receiving a survey in the mail.  Please fill it out.  Your opinion is  valuable to us.    ADDITIONAL EDUCATIONAL MATERIALS GIVEN TO PATIENT:        My signature on this form indicates that:  1.  I have reviewed and understand the above information  2.  My questions regarding this information have been answered to my satisfaction.  3.  I have formulated a plan with my discharge nurse to obtain my prescribed medication for home.

## 2017-02-27 NOTE — DISCHARGE SUMMARY
Discharge Summary:      Dulce Pineda      Admit Date:   2017  Discharge Date:  2017     Admitting diagnosis:  Pregnancy  IOL  Indication for care in labor or delivery  Discharge Diagnosis: Status post vaginal, spontaneous.  Pregnancy Complications: none  Tubal Ligation:  no        History:  History reviewed. No pertinent past medical history.  OB History    Para Term  AB SAB TAB Ectopic Multiple Living   4 2 2  1 1    2      # Outcome Date GA Lbr [place]/2nd Weight Sex Delivery Anes PTL Lv   4 Current            3 Term 14 40w0d  3.5 kg (7 lb 11.5 oz) M Vag-Spont None N Y   2 Term 12 40w0d  3.9 kg (8 lb 9.6 oz) F Vag-Spont None N Y   1 SAB  4w0d                  Review of patient's allergies indicates no known allergies.  Patient Active Problem List    Diagnosis Date Noted   • Indication for care in labor or delivery 2017   • Abnormal quad screen 2016   • Supervision of other normal pregnancy, antepartum 2016        Hospital Course:   26 y.o. , now para 3, was admitted with the above mentioned diagnosis, underwent Active Labor, vaginal, spontaneous. Patient postpartum course was unremarkable, with progressive advancement in diet , ambulation and toleration of oral analgesia. Patient without complaints today and desires discharge.      Filed Vitals:    17 2039 17 2140 17 0000 17 0400   BP: 138/63 132/64 122/65 125/67   Pulse: 83 82 67 75   Temp:  37.7 °C (99.9 °F) 37.9 °C (100.3 °F) 37.2 °C (99 °F)   TempSrc:       Resp:  16 16 14   Height:       Weight:       SpO2:  95% 94% 95%       Current Facility-Administered Medications   Medication Dose   • ondansetron (ZOFRAN ODT) dispertab 4 mg  4 mg    Or   • ondansetron (ZOFRAN) syringe/vial injection 4 mg  4 mg   • LR infusion     • methylergonovine (METHERGINE) injection 0.2 mg  0.2 mg   • carboPROST (HEMABATE) injection 250 mcg  250 mcg   • docusate sodium (COLACE) capsule 100  mg  100 mg   • oxytocin (PITOCIN) infusion (for postpartum)   mL/hr   • ibuprofen (MOTRIN) tablet 600 mg  600 mg   • oxycodone-acetaminophen (PERCOCET) 5-325 MG per tablet 1 Tab  1 Tab   • oxycodone-acetaminophen (PERCOCET-10)  MG per tablet 1 Tab  1 Tab   • lactated ringers infusion         Exam:  Breast Exam: negative  Abdomen: Abdomen soft, non-tender. BS normal. No masses,  No organomegaly  Fundus Non Tender: yes  Incision: dry and intact  Perineum: perineum intact  Extremity: extremities, peripheral pulses and reflexes normal     Labs:  Recent Labs      02/25/17   1054  02/27/17   0459   WBC  5.3  8.5   RBC  4.73  4.55   HEMOGLOBIN  12.2  12.1   HEMATOCRIT  38.8  37.2   MCV  82.0  81.8   MCH  25.8*  26.6*   MCHC  31.4*  32.5*   RDW  50.1*  50.2*   PLATELETCT  171  156*   MPV  12.5  11.9        Activity:   Discharge to home  Pelvic Rest x 6 weeks    Assessment:  normal postpartum course  Discharge Assessment: No areas of skin breakdown/redness; surgical incision intact/healing     [facility] 5 weeks for vaginal ; 1 week for incision check.      Discharge Meds:   Current Outpatient Prescriptions   Medication Sig Dispense Refill   • oxycodone-acetaminophen (PERCOCET) 5-325 MG Tab Take 1 Tab by mouth every four hours as needed (for Moderate Pain (Pain Scale 4-6) after delivery). 15 Tab 0       Aylin Baker M.D.

## 2017-02-27 NOTE — PROGRESS NOTES
"Dimple Correa R.N. Lactation Consultant Signed  Progress Notes 2/27/2017 11:00 AM      Expand All Collapse All    Lactation note:    Met with mother for help with breastfeeding. This is her 4th baby but she is complaining of nipple pain. Baby isn't 12 hours old yet. Baby has had a supplement of formula by bottle already. Mom attempts to latch baby but hunched over her. Repositioned Mom and had her try football hold since she has very large breasts. Showed mother how to stimulate baby's mouth to get a wide open latch. Baby tongue sucking and trying to bring just the nipple into her mouth. Talked to mother about having to \"train\" baby to open mouth wider before placing her nipple in her mouth and that it make take repeated tries. Mother stated that she always hurt when she breast fed her other 3 children but then she nursed each one for a year. We worked on positioning baby so chin is into breast and mother states that when latched this way her nipple doesn't hurt. Mother latched baby by herself and still had to work on baby opening with a wider mouth. Nina RN, IBCLC                        "

## 2017-02-27 NOTE — DELIVERY NOTE
Vaginal Delivery Note    Dulce Pineda is a  2, now para 3, who presented not in labor at 39w1d.  Patient progressed in active labor with pitocin augmentation/induction to cervical exam 100%; cervical dilation 10; station 0 to complete the first stage of labor.  Patient then progressed through second stage and delivered spontaneously a viable female infant over an intact perineum.  Nuchal cord not present.  Infant Apgar 8 and 9, and weight pending.    During the third stage the placenta was delivered spontaneously and was intact with 3 vessels    Assisted extraction:  none    Perineum repair:  none    Analgesia: none    Epidural:  yes    Family support:  yes    Infant bonding:  excellent    Estimated blood loss:  300 mL    Sponge count correct:  yes    Patient tolerated the procedure:  excellent

## 2017-02-27 NOTE — CARE PLAN
Problem: Altered physiologic condition related to immediate post-delivery state and potential for bleeding/hemorrhage  Goal: Patient physiologically stable as evidenced by normal lochia, palpable uterine involution and vital signs within normal limits  Outcome: PROGRESSING AS EXPECTED  Fundus firm, lochia light rubra.    Problem: Alteration in comfort related to episiotomy, vaginal repair and/or after birth pains  Goal: Patient verbalizes acceptable pain level  Outcome: PROGRESSING AS EXPECTED  Reviewed 0-10 Pain Scale and available pain meds with pt.

## 2017-02-27 NOTE — PROGRESS NOTES
Pt up from L&D via wheelchair with Kristopher BURNETT. Pt oriented to room, call light, emergency light, skylight. Assessment complete. Fundus firm, lochia light. Pt states pain at a 6; percocet and motrin given. Pt will call for pain meds as needed. Call light within reach. Will continue to monitor VS.

## 2017-02-27 NOTE — PROGRESS NOTES
Assessment done vital signs stable. Patient progressing according to plan of care. Fundus firm at the umbilicus with light lochia. Patient up voiding without difficulty. Ambulating with steady gait. Claims to have good pain relief with p.o medications. Breast feeding infant on demand.  Patient denies problems at this time. Patient encouraged to call for any needs. Pt claims she will call when pain medications are needed

## 2017-02-27 NOTE — PROGRESS NOTES
190 - report from LAITH Price RN.  FOB at bedside. POC discussed, questions encouraged and answered, understanding verbalized. Assessment done, WDL, will continue to monitor.    - pushing.   -  of viable female infant, 8/9 apgars, 3VC.    - spontaneous delivery of intact placenta.

## 2017-02-28 NOTE — PROGRESS NOTES
Pt education and discharge instructions reviewed with pt, expresses understanding. All questions answered, pt denies additional questions. Discharged home in stable condition with all personal belongings.

## 2017-11-20 ENCOUNTER — NON-PROVIDER VISIT (OUTPATIENT)
Dept: OBGYN | Facility: CLINIC | Age: 27
End: 2017-11-20

## 2017-11-20 DIAGNOSIS — Z32.01 PREGNANCY TEST POSITIVE: ICD-10-CM

## 2017-11-20 LAB
INT CON NEG: NEGATIVE
INT CON POS: POSITIVE
POC URINE PREGNANCY TEST: POSITIVE

## 2017-11-20 PROCEDURE — 81025 URINE PREGNANCY TEST: CPT | Performed by: OBSTETRICS & GYNECOLOGY

## 2017-12-07 ENCOUNTER — APPOINTMENT (OUTPATIENT)
Dept: OBGYN | Facility: CLINIC | Age: 27
End: 2017-12-07

## 2018-01-02 ENCOUNTER — APPOINTMENT (OUTPATIENT)
Dept: RADIOLOGY | Facility: MEDICAL CENTER | Age: 28
End: 2018-01-02
Attending: OBSTETRICS & GYNECOLOGY

## 2018-01-02 ENCOUNTER — HOSPITAL ENCOUNTER (OUTPATIENT)
Facility: MEDICAL CENTER | Age: 28
End: 2018-01-02
Attending: OBSTETRICS & GYNECOLOGY | Admitting: OBSTETRICS & GYNECOLOGY

## 2018-01-02 VITALS — DIASTOLIC BLOOD PRESSURE: 59 MMHG | TEMPERATURE: 97.3 F | HEART RATE: 88 BPM | SYSTOLIC BLOOD PRESSURE: 121 MMHG

## 2018-01-02 LAB
ABO GROUP BLD: NORMAL
AMPHET UR QL SCN: NEGATIVE
BARBITURATES UR QL SCN: NEGATIVE
BASOPHILS # BLD AUTO: 0.1 % (ref 0–1.8)
BASOPHILS # BLD: 0.01 K/UL (ref 0–0.12)
BENZODIAZ UR QL SCN: NEGATIVE
BLD GP AB SCN SERPL QL: NORMAL
BZE UR QL SCN: NEGATIVE
CANNABINOIDS UR QL SCN: NEGATIVE
EOSINOPHIL # BLD AUTO: 0.05 K/UL (ref 0–0.51)
EOSINOPHIL NFR BLD: 0.7 % (ref 0–6.9)
ERYTHROCYTE [DISTWIDTH] IN BLOOD BY AUTOMATED COUNT: 47.9 FL (ref 35.9–50)
HBV SURFACE AG SER QL: NEGATIVE
HCT VFR BLD AUTO: 37.8 % (ref 37–47)
HCV AB SER QL: NEGATIVE
HGB BLD-MCNC: 12.7 G/DL (ref 12–16)
HIV 1+2 AB+HIV1 P24 AG SERPL QL IA: NON REACTIVE
IMM GRANULOCYTES # BLD AUTO: 0.07 K/UL (ref 0–0.11)
IMM GRANULOCYTES NFR BLD AUTO: 1 % (ref 0–0.9)
LYMPHOCYTES # BLD AUTO: 1.67 K/UL (ref 1–4.8)
LYMPHOCYTES NFR BLD: 23.5 % (ref 22–41)
MCH RBC QN AUTO: 28.9 PG (ref 27–33)
MCHC RBC AUTO-ENTMCNC: 33.6 G/DL (ref 33.6–35)
MCV RBC AUTO: 86.1 FL (ref 81.4–97.8)
METHADONE UR QL SCN: NEGATIVE
MONOCYTES # BLD AUTO: 0.57 K/UL (ref 0–0.85)
MONOCYTES NFR BLD AUTO: 8 % (ref 0–13.4)
NEUTROPHILS # BLD AUTO: 4.75 K/UL (ref 2–7.15)
NEUTROPHILS NFR BLD: 66.7 % (ref 44–72)
NRBC # BLD AUTO: 0 K/UL
NRBC BLD-RTO: 0 /100 WBC
OPIATES UR QL SCN: NEGATIVE
OXYCODONE UR QL SCN: NEGATIVE
PCP UR QL SCN: NEGATIVE
PLATELET # BLD AUTO: 132 K/UL (ref 164–446)
PMV BLD AUTO: 12.5 FL (ref 9–12.9)
PROPOXYPH UR QL SCN: NEGATIVE
RBC # BLD AUTO: 4.39 M/UL (ref 4.2–5.4)
RH BLD: NORMAL
RUBV AB SER QL: 40.8 IU/ML
TREPONEMA PALLIDUM IGG+IGM AB [PRESENCE] IN SERUM OR PLASMA BY IMMUNOASSAY: NON REACTIVE
WBC # BLD AUTO: 7.1 K/UL (ref 4.8–10.8)

## 2018-01-02 PROCEDURE — 86901 BLOOD TYPING SEROLOGIC RH(D): CPT

## 2018-01-02 PROCEDURE — 59025 FETAL NON-STRESS TEST: CPT | Performed by: OBSTETRICS & GYNECOLOGY

## 2018-01-02 PROCEDURE — 86780 TREPONEMA PALLIDUM: CPT

## 2018-01-02 PROCEDURE — 76815 OB US LIMITED FETUS(S): CPT

## 2018-01-02 PROCEDURE — 87389 HIV-1 AG W/HIV-1&-2 AB AG IA: CPT

## 2018-01-02 PROCEDURE — 87340 HEPATITIS B SURFACE AG IA: CPT

## 2018-01-02 PROCEDURE — 86762 RUBELLA ANTIBODY: CPT

## 2018-01-02 PROCEDURE — 86803 HEPATITIS C AB TEST: CPT

## 2018-01-02 PROCEDURE — 80307 DRUG TEST PRSMV CHEM ANLYZR: CPT

## 2018-01-02 PROCEDURE — 85025 COMPLETE CBC W/AUTO DIFF WBC: CPT

## 2018-01-02 PROCEDURE — 86900 BLOOD TYPING SEROLOGIC ABO: CPT

## 2018-01-02 PROCEDURE — 86850 RBC ANTIBODY SCREEN: CPT

## 2018-01-02 PROCEDURE — 36415 COLL VENOUS BLD VENIPUNCTURE: CPT

## 2018-01-02 NOTE — PROGRESS NOTES
1315- pt arrived to unit with c/o cramping and not feeling well.   1327- EFM/TOCO in place. VSS. Pt states she has not had prenatal care this pregnancy d/t being unable to drive herself to the Dr.   1330- Dr. Maldonado in department, orders received.   1350- GBS collected

## 2018-01-03 NOTE — PROGRESS NOTES
1600-SVE by ELOISE Marcus RN closed/thick.  1615-Dr Rivers updated, discharge order received  1618 - Dr Rivers at bedside. POC dicussed with pt. RN -Discharge instructions given to pt. Pt states understanding and denies any questions. Pt to call [facility] to make appt.   1624-Pt discharged home with family

## 2018-01-22 ENCOUNTER — HOSPITAL ENCOUNTER (INPATIENT)
Facility: MEDICAL CENTER | Age: 28
LOS: 3 days | End: 2018-01-25
Attending: OBSTETRICS & GYNECOLOGY | Admitting: OBSTETRICS & GYNECOLOGY
Payer: MEDICAID

## 2018-01-22 LAB
BASOPHILS # BLD AUTO: 0.1 % (ref 0–1.8)
BASOPHILS # BLD: 0.01 K/UL (ref 0–0.12)
EOSINOPHIL # BLD AUTO: 0.02 K/UL (ref 0–0.51)
EOSINOPHIL NFR BLD: 0.3 % (ref 0–6.9)
ERYTHROCYTE [DISTWIDTH] IN BLOOD BY AUTOMATED COUNT: 50.3 FL (ref 35.9–50)
HCT VFR BLD AUTO: 39.8 % (ref 37–47)
HGB BLD-MCNC: 13.2 G/DL (ref 12–16)
HOLDING TUBE BB 8507: NORMAL
IMM GRANULOCYTES # BLD AUTO: 0.1 K/UL (ref 0–0.11)
IMM GRANULOCYTES NFR BLD AUTO: 1.3 % (ref 0–0.9)
LYMPHOCYTES # BLD AUTO: 1.79 K/UL (ref 1–4.8)
LYMPHOCYTES NFR BLD: 22.7 % (ref 22–41)
MCH RBC QN AUTO: 28.6 PG (ref 27–33)
MCHC RBC AUTO-ENTMCNC: 33.2 G/DL (ref 33.6–35)
MCV RBC AUTO: 86.3 FL (ref 81.4–97.8)
MONOCYTES # BLD AUTO: 0.62 K/UL (ref 0–0.85)
MONOCYTES NFR BLD AUTO: 7.9 % (ref 0–13.4)
NEUTROPHILS # BLD AUTO: 5.34 K/UL (ref 2–7.15)
NEUTROPHILS NFR BLD: 67.7 % (ref 44–72)
NRBC # BLD AUTO: 0 K/UL
NRBC BLD-RTO: 0 /100 WBC
PLATELET # BLD AUTO: 119 K/UL (ref 164–446)
PMV BLD AUTO: 13 FL (ref 9–12.9)
RBC # BLD AUTO: 4.61 M/UL (ref 4.2–5.4)
WBC # BLD AUTO: 7.9 K/UL (ref 4.8–10.8)

## 2018-01-22 PROCEDURE — 700111 HCHG RX REV CODE 636 W/ 250 OVERRIDE (IP): Performed by: OBSTETRICS & GYNECOLOGY

## 2018-01-22 PROCEDURE — 700111 HCHG RX REV CODE 636 W/ 250 OVERRIDE (IP)

## 2018-01-22 PROCEDURE — 4A1HX4Z MONITORING OF PRODUCTS OF CONCEPTION, CARDIAC ELECTRICAL ACTIVITY, EXTERNAL APPROACH: ICD-10-PCS | Performed by: OBSTETRICS & GYNECOLOGY

## 2018-01-22 PROCEDURE — 700105 HCHG RX REV CODE 258

## 2018-01-22 PROCEDURE — 85025 COMPLETE CBC W/AUTO DIFF WBC: CPT

## 2018-01-22 PROCEDURE — 770002 HCHG ROOM/CARE - OB PRIVATE (112)

## 2018-01-22 RX ORDER — MISOPROSTOL 200 UG/1
800 TABLET ORAL
Status: DISCONTINUED | OUTPATIENT
Start: 2018-01-22 | End: 2018-01-23 | Stop reason: HOSPADM

## 2018-01-22 RX ORDER — SODIUM CHLORIDE, SODIUM LACTATE, POTASSIUM CHLORIDE, CALCIUM CHLORIDE 600; 310; 30; 20 MG/100ML; MG/100ML; MG/100ML; MG/100ML
INJECTION, SOLUTION INTRAVENOUS CONTINUOUS
Status: DISPENSED | OUTPATIENT
Start: 2018-01-22 | End: 2018-01-22

## 2018-01-22 RX ORDER — AMPICILLIN 1 G/1
1 INJECTION, POWDER, FOR SOLUTION INTRAMUSCULAR; INTRAVENOUS EVERY 6 HOURS
Status: DISCONTINUED | OUTPATIENT
Start: 2018-01-22 | End: 2018-01-23

## 2018-01-22 RX ORDER — AMPICILLIN 1 G/1
2 INJECTION, POWDER, FOR SOLUTION INTRAMUSCULAR; INTRAVENOUS ONCE
Status: COMPLETED | OUTPATIENT
Start: 2018-01-22 | End: 2018-01-22

## 2018-01-22 RX ORDER — AMPICILLIN 2 G/1
INJECTION, POWDER, FOR SOLUTION INTRAVENOUS
Status: COMPLETED
Start: 2018-01-22 | End: 2018-01-22

## 2018-01-22 RX ORDER — SODIUM CHLORIDE, SODIUM LACTATE, POTASSIUM CHLORIDE, CALCIUM CHLORIDE 600; 310; 30; 20 MG/100ML; MG/100ML; MG/100ML; MG/100ML
INJECTION, SOLUTION INTRAVENOUS
Status: COMPLETED
Start: 2018-01-22 | End: 2018-01-22

## 2018-01-22 RX ORDER — CITRIC ACID/SODIUM CITRATE 334-500MG
30 SOLUTION, ORAL ORAL EVERY 6 HOURS PRN
Status: DISCONTINUED | OUTPATIENT
Start: 2018-01-22 | End: 2018-01-23 | Stop reason: HOSPADM

## 2018-01-22 RX ADMIN — FENTANYL CITRATE 100 MCG: 50 INJECTION, SOLUTION INTRAMUSCULAR; INTRAVENOUS at 23:42

## 2018-01-22 RX ADMIN — SODIUM CHLORIDE, POTASSIUM CHLORIDE, SODIUM LACTATE AND CALCIUM CHLORIDE 1000 ML: 600; 310; 30; 20 INJECTION, SOLUTION INTRAVENOUS at 12:17

## 2018-01-22 RX ADMIN — AMPICILLIN 2 G: 1 INJECTION, POWDER, FOR SOLUTION INTRAMUSCULAR; INTRAVENOUS at 14:59

## 2018-01-22 RX ADMIN — AMPICILLIN SODIUM 2 G: 2 INJECTION, POWDER, FOR SOLUTION INTRAMUSCULAR; INTRAVENOUS at 14:59

## 2018-01-22 RX ADMIN — AMPICILLIN SODIUM 1 G: 1 INJECTION, POWDER, FOR SOLUTION INTRAMUSCULAR; INTRAVENOUS at 19:19

## 2018-01-22 RX ADMIN — SODIUM CHLORIDE, SODIUM LACTATE, POTASSIUM CHLORIDE, CALCIUM CHLORIDE 1000 ML: 600; 310; 30; 20 INJECTION, SOLUTION INTRAVENOUS at 12:17

## 2018-01-22 RX ADMIN — Medication 2 MILLI-UNITS/MIN: at 14:36

## 2018-01-22 ASSESSMENT — PATIENT HEALTH QUESTIONNAIRE - PHQ9
1. LITTLE INTEREST OR PLEASURE IN DOING THINGS: NOT AT ALL
SUM OF ALL RESPONSES TO PHQ9 QUESTIONS 1 AND 2: 0
SUM OF ALL RESPONSES TO PHQ QUESTIONS 1-9: 0
2. FEELING DOWN, DEPRESSED, IRRITABLE, OR HOPELESS: NOT AT ALL

## 2018-01-22 ASSESSMENT — LIFESTYLE VARIABLES
DO YOU DRINK ALCOHOL: NO
EVER_SMOKED: NEVER
EVER_SMOKED: NEVER
ALCOHOL_USE: NO

## 2018-01-22 ASSESSMENT — COPD QUESTIONNAIRES
HAVE YOU SMOKED AT LEAST 100 CIGARETTES IN YOUR ENTIRE LIFE: NO/DON'T KNOW
DO YOU EVER COUGH UP ANY MUCUS OR PHLEGM?: NO/ONLY WITH OCCASIONAL COLDS OR INFECTIONS
COPD SCREENING SCORE: 0
DURING THE PAST 4 WEEKS HOW MUCH DID YOU FEEL SHORT OF BREATH: NONE/LITTLE OF THE TIME

## 2018-01-22 NOTE — H&P
History and Physical      Dulce Pineda is a 27 y.o. female  39 weeks 6 days who presents for LOF - at about 7 am today    Subjective:   positive  For CTXS.   negative Feels pain   positive for LOF  negative for vaginal bleeding.   positive for fetal movement    ROS: A comprehensive review of systems was negative.    No past medical history on file.  No past surgical history on file.  OB History    Para Term  AB Living   4 2 2   1 2   SAB TAB Ectopic Molar Multiple Live Births   1         2      # Outcome Date GA Lbr [place]/2nd Weight Sex Delivery Anes PTL Lv   4             3 Term 14 40w0d  3.5 kg (7 lb 11.5 oz) M Vag-Spont None N [place]   2 Term 12 40w0d  3.9 kg (8 lb 9.6 oz) F Vag-Spont None N [place]   1 SAB  4w0d               Social History     Social History   • Marital status:      Spouse name: N/A   • Number of children: N/A   • Years of education: N/A     Occupational History   • Not on file.     Social History Main Topics   • Smoking status: Never Smoker   • Smokeless tobacco: Not on file   • Alcohol use Yes      Comment: occasionally not during pregnancy   • Drug use: No   • Sexual activity: Yes     Partners: Male      Comment: Has never used birth control     Other Topics Concern   • Not on file     Social History Narrative   • No narrative on file     Allergies: Patient has no known allergies.    Current Facility-Administered Medications:   •  LR infusion, , Intravenous, Continuous, Renee Coburn M.D.  •  fentaNYL (SUBLIMAZE) injection 50 mcg, 50 mcg, Intravenous, Q HOUR PRN, Renee Coburn M.D.  •  fentaNYL (SUBLIMAZE) injection 100 mcg, 100 mcg, Intravenous, Q HOUR PRN, Renee Coburn M.D.  •  Sod Citrate-Citric Acid (BICITRA) 500-334 MG/5ML solution 30 mL, 30 mL, Oral, Q6HRS PRN, Renee Coburn M.D.  •  oxytocin (PITOCIN) infusion (for induction), 0.5-20 camille-units/min, Intravenous, Continuous,  "Renee Coburn M.D.  •  misoprostol (CYTOTEC) tablet 800 mcg, 800 mcg, Rectal, Once PRN, Renee Coburn M.D.    Prenatal care with NO PRENATAL CARE   Patient Active Problem List    Diagnosis Date Noted   • Indication for care in labor or delivery 01/22/2018     Objective:      Blood pressure 110/55, pulse 96, temperature 36.5 °C (97.7 °F), temperature source Temporal, resp. rate 16, height 1.778 m (5' 10\"), weight 90.7 kg (200 lb), unknown if currently breastfeeding.    General:   no acute distress, alert, cooperative   Skin:   normal   HEENT:  Sclera clear, anicteric   Lungs:   CTA bilateral   Heart:   S1, S2 normal, no murmur, click, rub or gallop, regular rate and rhythm   Abdomen:   gravid, NT   EFW:  8062-3748   Pelvis:  adequate with gynecoid pelvis   FHT:  150 BPM   Uterine Size: S=D   Presentations: Cephalic   Cervix: (+) gross pooling, clear    Dilation: 1cm    Effacement: Long    Station:  -3    Consistency: Soft    Position: Posterior     Lab Review  Lab:   Blood type: A     Recent Results (from the past 5880 hour(s))   POCT Pregnancy    Collection Time: 11/20/17 10:20 AM   Result Value Ref Range    POC Urine Pregnancy Test Positive Negative    Internal Control Positive Positive     Internal Control Negative Negative    URINE DRUG SCREEN    Collection Time: 01/02/18  2:00 PM   Result Value Ref Range    Amphetamines Urine Negative Negative    Barbiturates Negative Negative    Benzodiazepines Negative Negative    Cocaine Metabolite Negative Negative    Methadone Negative Negative    Opiates Negative Negative    Oxycodone Negative Negative    Phencyclidine -Pcp Negative Negative    Propoxyphene Negative Negative    Cannabinoid Metab Negative Negative   OP PRENATAL PANEL-BLOOD BANK    Collection Time: 01/02/18  2:00 PM   Result Value Ref Range    ABO Grouping Only A     Rh Grouping Only POS     Antibody Screen Scrn NEG    PRENATAL PANEL 3+HIV+HCV    Collection Time: 01/02/18  " 2:02 PM   Result Value Ref Range    WBC 7.1 4.8 - 10.8 K/uL    RBC 4.39 4.20 - 5.40 M/uL    Hemoglobin 12.7 12.0 - 16.0 g/dL    Hematocrit 37.8 37.0 - 47.0 %    MCV 86.1 81.4 - 97.8 fL    MCH 28.9 27.0 - 33.0 pg    MCHC 33.6 33.6 - 35.0 g/dL    RDW 47.9 35.9 - 50.0 fL    Platelet Count 132 (L) 164 - 446 K/uL    MPV 12.5 9.0 - 12.9 fL    Neutrophils-Polys 66.70 44.00 - 72.00 %    Lymphocytes 23.50 22.00 - 41.00 %    Monocytes 8.00 0.00 - 13.40 %    Eosinophils 0.70 0.00 - 6.90 %    Basophils 0.10 0.00 - 1.80 %    Immature Granulocytes 1.00 (H) 0.00 - 0.90 %    Nucleated RBC 0.00 /100 WBC    Neutrophils (Absolute) 4.75 2.00 - 7.15 K/uL    Lymphs (Absolute) 1.67 1.00 - 4.80 K/uL    Monos (Absolute) 0.57 0.00 - 0.85 K/uL    Eos (Absolute) 0.05 0.00 - 0.51 K/uL    Baso (Absolute) 0.01 0.00 - 0.12 K/uL    Immature Granulocytes (abs) 0.07 0.00 - 0.11 K/uL    NRBC (Absolute) 0.00 K/uL    Rubella IgG Antibody 40.80 IU/mL    Syphilis, Treponemal Qual Non Reactive Non Reactive    Hepatitis B Surface Antigen Negative Negative    Hepatitis C Antibody Negative Negative   HIV AG/AB COMBO ASSAY SCREENING    Collection Time: 18  2:02 PM   Result Value Ref Range    HIV Ag/Ab Combo Assay Non Reactive Non Reactive     Assessment:   Dulce Pineda 39 weeks 6 days by sure LMP  WALK-IN/NO PRENATAL CARE  PROM - clear  Obstetrical history significant for   Patient Active Problem List    Diagnosis Date Noted   • Indication for care in labor or delivery 2018   .      Plan:     Admit to L&D  GBS UNKNOWN  Start pitocin for IOL  Epidural for pain mgt  Anticipate

## 2018-01-22 NOTE — PROGRESS NOTES
EDC -39.6    1155- Pt here form home with c/o SROM at 0700 am this am. Pt denies any contractions or bleeding and reports + FM. VSS. SVE 1/thick/high.  1210-Dr Earl Duran called, report given. Admit orders received  1215-IV started labs drawn. Waiting on FOB to come  kids  1305-Dr Earl Duran at bedside. Pt samir, order received to start pitocin. Will start once FOB is here  1320-Pt updated, FOB on his way. Pt states understanding

## 2018-01-23 LAB
ERYTHROCYTE [DISTWIDTH] IN BLOOD BY AUTOMATED COUNT: 52.4 FL (ref 35.9–50)
HCT VFR BLD AUTO: 36.1 % (ref 37–47)
HGB BLD-MCNC: 12 G/DL (ref 12–16)
MCH RBC QN AUTO: 29.4 PG (ref 27–33)
MCHC RBC AUTO-ENTMCNC: 33.2 G/DL (ref 33.6–35)
MCV RBC AUTO: 88.5 FL (ref 81.4–97.8)
PLATELET # BLD AUTO: 117 K/UL (ref 164–446)
PMV BLD AUTO: 13.7 FL (ref 9–12.9)
RBC # BLD AUTO: 4.08 M/UL (ref 4.2–5.4)
WBC # BLD AUTO: 11.4 K/UL (ref 4.8–10.8)

## 2018-01-23 PROCEDURE — 700111 HCHG RX REV CODE 636 W/ 250 OVERRIDE (IP): Performed by: OBSTETRICS & GYNECOLOGY

## 2018-01-23 PROCEDURE — 90471 IMMUNIZATION ADMIN: CPT

## 2018-01-23 PROCEDURE — 85027 COMPLETE CBC AUTOMATED: CPT

## 2018-01-23 PROCEDURE — 304965 HCHG RECOVERY SERVICES

## 2018-01-23 PROCEDURE — 770002 HCHG ROOM/CARE - OB PRIVATE (112)

## 2018-01-23 PROCEDURE — 90686 IIV4 VACC NO PRSV 0.5 ML IM: CPT | Performed by: OBSTETRICS & GYNECOLOGY

## 2018-01-23 PROCEDURE — 36415 COLL VENOUS BLD VENIPUNCTURE: CPT

## 2018-01-23 PROCEDURE — 59409 OBSTETRICAL CARE: CPT

## 2018-01-23 PROCEDURE — A9270 NON-COVERED ITEM OR SERVICE: HCPCS | Performed by: OBSTETRICS & GYNECOLOGY

## 2018-01-23 PROCEDURE — 700102 HCHG RX REV CODE 250 W/ 637 OVERRIDE(OP): Performed by: OBSTETRICS & GYNECOLOGY

## 2018-01-23 PROCEDURE — 3E0234Z INTRODUCTION OF SERUM, TOXOID AND VACCINE INTO MUSCLE, PERCUTANEOUS APPROACH: ICD-10-PCS | Performed by: OBSTETRICS & GYNECOLOGY

## 2018-01-23 RX ORDER — IBUPROFEN 600 MG/1
600 TABLET ORAL EVERY 6 HOURS PRN
Status: DISCONTINUED | OUTPATIENT
Start: 2018-01-23 | End: 2018-01-25 | Stop reason: HOSPADM

## 2018-01-23 RX ORDER — DOCUSATE SODIUM 100 MG/1
100 CAPSULE, LIQUID FILLED ORAL 2 TIMES DAILY PRN
Status: DISCONTINUED | OUTPATIENT
Start: 2018-01-23 | End: 2018-01-25 | Stop reason: HOSPADM

## 2018-01-23 RX ORDER — DOCUSATE SODIUM 100 MG/1
100 CAPSULE, LIQUID FILLED ORAL 2 TIMES DAILY PRN
Status: CANCELLED | OUTPATIENT
Start: 2018-01-23

## 2018-01-23 RX ORDER — SODIUM CHLORIDE, SODIUM LACTATE, POTASSIUM CHLORIDE, CALCIUM CHLORIDE 600; 310; 30; 20 MG/100ML; MG/100ML; MG/100ML; MG/100ML
INJECTION, SOLUTION INTRAVENOUS PRN
Status: DISCONTINUED | OUTPATIENT
Start: 2018-01-23 | End: 2018-01-25 | Stop reason: HOSPADM

## 2018-01-23 RX ORDER — ONDANSETRON 4 MG/1
4 TABLET, ORALLY DISINTEGRATING ORAL EVERY 6 HOURS PRN
Status: DISCONTINUED | OUTPATIENT
Start: 2018-01-23 | End: 2018-01-25 | Stop reason: HOSPADM

## 2018-01-23 RX ORDER — MISOPROSTOL 200 UG/1
600 TABLET ORAL
Status: CANCELLED | OUTPATIENT
Start: 2018-01-23

## 2018-01-23 RX ORDER — OXYCODONE AND ACETAMINOPHEN 10; 325 MG/1; MG/1
1 TABLET ORAL EVERY 4 HOURS PRN
Status: DISCONTINUED | OUTPATIENT
Start: 2018-01-23 | End: 2018-01-25 | Stop reason: HOSPADM

## 2018-01-23 RX ORDER — OXYCODONE HYDROCHLORIDE AND ACETAMINOPHEN 5; 325 MG/1; MG/1
1 TABLET ORAL EVERY 4 HOURS PRN
Status: DISCONTINUED | OUTPATIENT
Start: 2018-01-23 | End: 2018-01-25 | Stop reason: HOSPADM

## 2018-01-23 RX ORDER — MISOPROSTOL 200 UG/1
800 TABLET ORAL
Status: DISCONTINUED | OUTPATIENT
Start: 2018-01-23 | End: 2018-01-25 | Stop reason: HOSPADM

## 2018-01-23 RX ORDER — SODIUM CHLORIDE, SODIUM LACTATE, POTASSIUM CHLORIDE, CALCIUM CHLORIDE 600; 310; 30; 20 MG/100ML; MG/100ML; MG/100ML; MG/100ML
INJECTION, SOLUTION INTRAVENOUS PRN
Status: CANCELLED | OUTPATIENT
Start: 2018-01-23

## 2018-01-23 RX ORDER — VITAMIN A ACETATE, BETA CAROTENE, ASCORBIC ACID, CHOLECALCIFEROL, .ALPHA.-TOCOPHEROL ACETATE, DL-, THIAMINE MONONITRATE, RIBOFLAVIN, NIACINAMIDE, PYRIDOXINE HYDROCHLORIDE, FOLIC ACID, CYANOCOBALAMIN, CALCIUM CARBONATE, FERROUS FUMARATE, ZINC OXIDE, CUPRIC OXIDE 3080; 12; 120; 400; 1; 1.84; 3; 20; 22; 920; 25; 200; 27; 10; 2 [IU]/1; UG/1; MG/1; [IU]/1; MG/1; MG/1; MG/1; MG/1; MG/1; [IU]/1; MG/1; MG/1; MG/1; MG/1; MG/1
1 TABLET, FILM COATED ORAL EVERY MORNING
Status: CANCELLED | OUTPATIENT
Start: 2018-01-23

## 2018-01-23 RX ORDER — ONDANSETRON 2 MG/ML
4 INJECTION INTRAMUSCULAR; INTRAVENOUS EVERY 6 HOURS PRN
Status: DISCONTINUED | OUTPATIENT
Start: 2018-01-23 | End: 2018-01-25 | Stop reason: HOSPADM

## 2018-01-23 RX ORDER — METHYLERGONOVINE MALEATE 0.2 MG/ML
0.2 INJECTION INTRAVENOUS
Status: CANCELLED | OUTPATIENT
Start: 2018-01-23

## 2018-01-23 RX ADMIN — OXYCODONE HYDROCHLORIDE AND ACETAMINOPHEN 1 TABLET: 10; 325 TABLET ORAL at 01:47

## 2018-01-23 RX ADMIN — IBUPROFEN 600 MG: 600 TABLET, FILM COATED ORAL at 03:23

## 2018-01-23 RX ADMIN — IBUPROFEN 600 MG: 600 TABLET, FILM COATED ORAL at 16:27

## 2018-01-23 RX ADMIN — INFLUENZA A VIRUS A/MICHIGAN/45/2015 X-275 (H1N1) ANTIGEN (FORMALDEHYDE INACTIVATED), INFLUENZA A VIRUS A/HONG KONG/4801/2014 X-263B (H3N2) ANTIGEN (FORMALDEHYDE INACTIVATED), INFLUENZA B VIRUS B/PHUKET/3073/2013 ANTIGEN (FORMALDEHYDE INACTIVATED), AND INFLUENZA B VIRUS B/BRISBANE/60/2008 ANTIGEN (FORMALDEHYDE INACTIVATED) 0.5 ML: 15; 15; 15; 15 INJECTION, SUSPENSION INTRAMUSCULAR at 05:15

## 2018-01-23 RX ADMIN — OXYTOCIN 2000 ML/HR: 10 INJECTION, SOLUTION INTRAMUSCULAR; INTRAVENOUS at 01:30

## 2018-01-23 RX ADMIN — Medication 125 ML/HR: at 02:20

## 2018-01-23 RX ADMIN — OXYCODONE HYDROCHLORIDE AND ACETAMINOPHEN 1 TABLET: 5; 325 TABLET ORAL at 16:27

## 2018-01-23 ASSESSMENT — PAIN SCALES - GENERAL
PAINLEVEL_OUTOF10: 2
PAINLEVEL_OUTOF10: 2
PAINLEVEL_OUTOF10: 0
PAINLEVEL_OUTOF10: 5
PAINLEVEL_OUTOF10: 1

## 2018-01-23 NOTE — CARE PLAN
Problem: Communication  Goal: The ability to communicate needs accurately and effectively will improve  Outcome: PROGRESSING AS EXPECTED  Discussed POC. Whiteboards updated. Call light within reach. Patient encouraged to call with any needs and or concerns.     Problem: Altered physiologic condition related to immediate post-delivery state and potential for bleeding/hemorrhage  Goal: Patient physiologically stable as evidenced by normal lochia, palpable uterine involution and vital signs within normal limits  Outcome: PROGRESSING AS EXPECTED  VSS. Fundus firm with light lochia. Patient educated on when to pull emergency call light.

## 2018-01-23 NOTE — PROGRESS NOTES
Pt to room 340 via wheelchair infant in arms. Report received from HORTENCIA Atwood. Bands verified. Cuddles tag on and flashing. IV patent running second bag of pitocin at 125 cc/hr. Rates pain 5/10, medicated see MAR. Assessment done, fundus firm with scant lochia. Oriented to unit and room. Call light/emergency call light discussed. Discussed POC and whiteboards. Educated parent on safe sleep. Call light within reach. Patient encouraged to call with any needs and or concerns.

## 2018-01-23 NOTE — PROGRESS NOTES
1900: Report received from RAJ Spring RN. Plan of care discussed. Patient resting in bed with call light in reach.   2048: Tamsen at bedside to check patient. SVE: 1/50/-3.  2240: Tamsen at bedside to recheck patient. SVE: 2-3/50/-3. Will continue to monitor.   0100: Patient stating she is feeling a large amount of pressure at this time. SVE: complete/+1. MD notified.  0110: ALYCE Keith and MILAGRO Penny (transition nurse) at bedside.  0114: Delivery of a viable male infant. APGARS 8/9.  0129: Delivery of intact placenta. 3 VC.   0230: Patient up to bathroom. Stable on feet. Patient voids at this time.  0245: Report given to HORTENCIA Murray. Plan of care discussed. Patient resting in bed with call light in reach.

## 2018-01-23 NOTE — PROGRESS NOTES
1500 Report rcvd from HORTENCIA Campos, at bedside. POC discussed, pt care assumed. Pt found to be sitting up in bed in no apparent distress. Ampiciliin started via pump for GBS unknown status.   1900 Report to HORTENCIA Atwood, at bedside.

## 2018-01-23 NOTE — CONSULTS
Offered to assist mom with breastfeeding and/or address any concerns. Mom states she breast fed her other children and that this baby is nursing well.  Lactation resources reviewed and on-going support offered.

## 2018-01-23 NOTE — DISCHARGE PLANNING
:    Infant: Grzegorz Lemons (: 18)    Referral: Walk-in, no prenatal care.    Intervention:  Reviewed medical record and met with MOB who delivered her fourth baby.  The FOB is Noemy Lemons and he is involved and supportive.  Parents have 5 year old, Aruna Lemons (12), 3 year old, Attila Lemons (14), and 11 month old, Asya Lemons (17).  Verified parent's address and phone number which is [address] [place] [address].  Phone number is [phone].  MOB states she has supplies for infant and is receiving Emergency Medicaid and WIC.  Provided MOB with a pediatrician list, children's resource list, diaper bank referral, and a bag of infant supplies.  Discussed the [employer] for a car seat and recommended she call ASAP.  Also discussed the [employer] to assist with a pack-n-play.  Mother stated she did not receive prenatal care because she already had three children and didn't feel it was necessary.  She denies any drug or alcohol use during the pregnancy and infant's tox screen was negative.      Plan:  Infant is cleared to discharge home with parents.

## 2018-01-23 NOTE — PROGRESS NOTES
Assessment completed WDL. Pt denies pain at this time. Pt states that she will call when pain medication is needed. Plan of care discussed. Pt encouraged to call with needs.

## 2018-01-23 NOTE — CARE PLAN
Problem: Safety  Goal: Free from accidental injury  Outcome: PROGRESSING AS EXPECTED  Patient resting in bed with call light in reach. Bed in low position. Progressing as expected.     Problem: Knowledge Deficit  Goal: Patient/Support person demonstrates understanding regarding the progression of labor, available options and participates in decision-making process  Outcome: PROGRESSING AS EXPECTED  Patient and FOB educated on labor process. All questions answered at this time. Progressing as expected.

## 2018-01-23 NOTE — CARE PLAN
Problem: Alteration in comfort related to episiotomy, vaginal repair and/or after birth pains  Goal: Patient is able to ambulate, care for self and infant  Outcome: PROGRESSING AS EXPECTED  Pt up in room caring for infant and self well. Denies pain at this time.  Goal: Patient verbalizes acceptable pain level  Outcome: PROGRESSING AS EXPECTED  Pt states that pain is well controlled with current pain medication.

## 2018-01-23 NOTE — DELIVERY NOTE
DATE OF SERVICE:  2018    DELIVERY NOTE:  On 2018 at 0114 hours, this 27-year-old  5, para   3  female with an intrauterine pregnancy at 40 weeks and 0/7 days,   which is based on a 35-week ultrasound, delivered a normal male infant with   Apgar scores of 8 and 9, weighing 3500 grams or 7 pounds 11.5 ounces.  Patient   was admitted, found to be grossly spontaneous rupture of membranes with clear   fluid.  Patient has had no prenatal care visits and again one ultrasound at   35 weeks, so GBS was unknown.  Patient was started on ampicillin and received   Pitocin, and progressed to complete.  Delivery was via normal spontaneous   vaginal delivery to a sterile field in an occiput anterior position.  Loose   nuchal cord x1 was reduced after delivery of the infant.  Infant was placed on   maternal abdomen and bulb suctioned by the waiting RN.  Delayed cord clamping   occurred until the cord stopped pulsing.  The cord was clamped by myself and   cut by the father of the baby.  Infant was walked to the warmer.  An intact   placenta with a 3-vessel cord delivered spontaneously at 0129 hours.  Pitocin   was then run wide open in the IV.  Patient had good hemostasis.  Vagina was   explored and a scratch on the perineum was noted, but had excellent   hemostasis.  No other lacerations were noted and no repair performed.  Fundus   was found to be firm.    ESTIMATED BLOOD LOSS:  450 mL.       ____________________________________     ALYCE DIEGO / COURTNEY    DD:  2018 01:42:16  DT:  2018 02:03:57    D#:  [id number]  Job#:  [id number]

## 2018-01-24 PROCEDURE — A9270 NON-COVERED ITEM OR SERVICE: HCPCS | Performed by: OBSTETRICS & GYNECOLOGY

## 2018-01-24 PROCEDURE — 770002 HCHG ROOM/CARE - OB PRIVATE (112)

## 2018-01-24 PROCEDURE — 700102 HCHG RX REV CODE 250 W/ 637 OVERRIDE(OP): Performed by: OBSTETRICS & GYNECOLOGY

## 2018-01-24 RX ADMIN — IBUPROFEN 600 MG: 600 TABLET, FILM COATED ORAL at 20:35

## 2018-01-24 RX ADMIN — IBUPROFEN 600 MG: 600 TABLET, FILM COATED ORAL at 13:17

## 2018-01-24 RX ADMIN — OXYCODONE HYDROCHLORIDE AND ACETAMINOPHEN 1 TABLET: 5; 325 TABLET ORAL at 20:35

## 2018-01-24 RX ADMIN — OXYCODONE HYDROCHLORIDE AND ACETAMINOPHEN 1 TABLET: 5; 325 TABLET ORAL at 06:17

## 2018-01-24 RX ADMIN — IBUPROFEN 600 MG: 600 TABLET, FILM COATED ORAL at 06:17

## 2018-01-24 ASSESSMENT — PAIN SCALES - GENERAL
PAINLEVEL_OUTOF10: 1
PAINLEVEL_OUTOF10: 0
PAINLEVEL_OUTOF10: 3
PAINLEVEL_OUTOF10: 6
PAINLEVEL_OUTOF10: 0
PAINLEVEL_OUTOF10: 1
PAINLEVEL_OUTOF10: 2
PAINLEVEL_OUTOF10: 7
PAINLEVEL_OUTOF10: 3
PAINLEVEL_OUTOF10: 0
PAINLEVEL_OUTOF10: 2

## 2018-01-24 NOTE — CARE PLAN
Problem: Altered physiologic condition related to immediate post-delivery state and potential for bleeding/hemorrhage  Goal: Patient physiologically stable as evidenced by normal lochia, palpable uterine involution and vital signs within normal limits  Outcome: PROGRESSING AS EXPECTED  Fundus firm, lochia light, vitals stable. Pt has been able to void multiple times without difficulty.     Problem: Alteration in comfort related to episiotomy, vaginal repair and/or after birth pains  Goal: Patient is able to ambulate, care for self and infant  Outcome: PROGRESSING AS EXPECTED  Pt states pain is being adequately controlled, able to ambulate and care for self and infant. Encouraged pt to call for PRN pain medications as needed. Pain assessed q2-4 hours.

## 2018-01-24 NOTE — PROGRESS NOTES
Bedside report received, assumed care of pt. Assessment complete, VSS, fundus firm, lochia light. Pt voiding without difficulty. Bonding well with infant. Pt states pain is being well controlled and will call for PRN pain meds as needed. She is breast and bottle feeding, provided with Supplementation guidelines handout. POC discussed with pt and family, questions answered, call light within reach.

## 2018-01-24 NOTE — PROGRESS NOTES
"Dulce Pineda PP day 1 with no prenatal care    Subjective: Abdominal pain. no, ambulating .yes, tolerating liquids .yes, tolerating regular diet .yes, flatus.yes, BM .no, Bleeding .light, voiding .yes,dizziness .no, breast feeding.no, breast tenderness .no    Blood pressure 101/62, pulse 86, temperature 37.1 °C (98.7 °F), resp. rate 20, height 1.778 m (5' 10\"), weight 90.7 kg (200 lb), SpO2 98 %, unknown if currently breastfeeding.  Breast Exam: Tenderness .no, Engourgement .no, Mastitis .no  Abdomen soft, non-tender. BS normal. No masses,  No organomegaly  Incision: none  Fundus Tenderness:no, Below umbilicus:Yes, firm  Perineumperineum intact  ExtremitiesNormal, no cyanosis, clubbing    Meds:   No current facility-administered medications on file prior to encounter.      Current Outpatient Prescriptions on File Prior to Encounter   Medication Sig Dispense Refill   • Prenatal Multivit-Min-Fe-FA (PRENATAL VITAMINS PO) Take  by mouth.         Lab:   Recent Results (from the past 48 hour(s))   Hold Blood Bank Specimen (Not Tested)    Collection Time: 01/22/18 12:15 PM   Result Value Ref Range    Holding Tube - Bb DONE    CBC WITH DIFFERENTIAL    Collection Time: 01/22/18 12:15 PM   Result Value Ref Range    WBC 7.9 4.8 - 10.8 K/uL    RBC 4.61 4.20 - 5.40 M/uL    Hemoglobin 13.2 12.0 - 16.0 g/dL    Hematocrit 39.8 37.0 - 47.0 %    MCV 86.3 81.4 - 97.8 fL    MCH 28.6 27.0 - 33.0 pg    MCHC 33.2 (L) 33.6 - 35.0 g/dL    RDW 50.3 (H) 35.9 - 50.0 fL    Platelet Count 119 (L) 164 - 446 K/uL    MPV 13.0 (H) 9.0 - 12.9 fL    Neutrophils-Polys 67.70 44.00 - 72.00 %    Lymphocytes 22.70 22.00 - 41.00 %    Monocytes 7.90 0.00 - 13.40 %    Eosinophils 0.30 0.00 - 6.90 %    Basophils 0.10 0.00 - 1.80 %    Immature Granulocytes 1.30 (H) 0.00 - 0.90 %    Nucleated RBC 0.00 /100 WBC    Neutrophils (Absolute) 5.34 2.00 - 7.15 K/uL    Lymphs (Absolute) 1.79 1.00 - 4.80 K/uL    Monos (Absolute) 0.62 0.00 - 0.85 K/uL    Eos (Absolute) " 0.02 0.00 - 0.51 K/uL    Baso (Absolute) 0.01 0.00 - 0.12 K/uL    Immature Granulocytes (abs) 0.10 0.00 - 0.11 K/uL    NRBC (Absolute) 0.00 K/uL   CBC without differential    Collection Time: 18  8:49 AM   Result Value Ref Range    WBC 11.4 (H) 4.8 - 10.8 K/uL    RBC 4.08 (L) 4.20 - 5.40 M/uL    Hemoglobin 12.0 12.0 - 16.0 g/dL    Hematocrit 36.1 (L) 37.0 - 47.0 %    MCV 88.5 81.4 - 97.8 fL    MCH 29.4 27.0 - 33.0 pg    MCHC 33.2 (L) 33.6 - 35.0 g/dL    RDW 52.4 (H) 35.9 - 50.0 fL    Platelet Count 117 (L) 164 - 446 K/uL    MPV 13.7 (H) 9.0 - 12.9 fL       Assessment and Plan  normal postpartum course and Course complicated by lack of prenatal care  No heavy bleeding or foul vaginal discharge     Continue Routine postpartum care  48 hour observation for  via peds secondary to lack of prenatal care

## 2018-01-24 NOTE — PROGRESS NOTES
0705-Report received at bedside from Deb BURNETT, assumed care of patient.  Encouraged to call with needs, denies at this time.   0815-Assessment completed, fundus is firm, lochia light and vital signs within defined parameters. Patient is ambulating and voiding without difficulty. Bonding well with infant, breastfeeding independently.  Discussed with patient pain medication plan, patient requesting to be medicated PRN, will call for medication.  Plan of care discussed, patient verbalized understanding. Hourly rounding implemented, call light within reach.

## 2018-01-25 VITALS
SYSTOLIC BLOOD PRESSURE: 111 MMHG | BODY MASS INDEX: 28.63 KG/M2 | HEIGHT: 70 IN | TEMPERATURE: 97.9 F | RESPIRATION RATE: 20 BRPM | DIASTOLIC BLOOD PRESSURE: 62 MMHG | WEIGHT: 200 LBS | HEART RATE: 82 BPM | OXYGEN SATURATION: 97 %

## 2018-01-25 PROCEDURE — A9270 NON-COVERED ITEM OR SERVICE: HCPCS | Performed by: OBSTETRICS & GYNECOLOGY

## 2018-01-25 PROCEDURE — 700102 HCHG RX REV CODE 250 W/ 637 OVERRIDE(OP): Performed by: OBSTETRICS & GYNECOLOGY

## 2018-01-25 RX ORDER — PSEUDOEPHEDRINE HCL 30 MG
100 TABLET ORAL 2 TIMES DAILY PRN
Qty: 60 CAP | Refills: 1 | Status: SHIPPED | OUTPATIENT
Start: 2018-01-25

## 2018-01-25 RX ORDER — IBUPROFEN 800 MG/1
800 TABLET ORAL EVERY 8 HOURS PRN
Qty: 30 TAB | Refills: 1 | Status: SHIPPED | OUTPATIENT
Start: 2018-01-25

## 2018-01-25 RX ADMIN — OXYCODONE HYDROCHLORIDE AND ACETAMINOPHEN 1 TABLET: 5; 325 TABLET ORAL at 09:03

## 2018-01-25 RX ADMIN — IBUPROFEN 600 MG: 600 TABLET, FILM COATED ORAL at 09:03

## 2018-01-25 ASSESSMENT — PAIN SCALES - GENERAL
PAINLEVEL_OUTOF10: 3
PAINLEVEL_OUTOF10: 2
PAINLEVEL_OUTOF10: 3
PAINLEVEL_OUTOF10: 5

## 2018-01-25 ASSESSMENT — LIFESTYLE VARIABLES: EVER_SMOKED: NEVER

## 2018-01-25 NOTE — CARE PLAN
Problem: Alteration in comfort related to episiotomy, vaginal repair and/or after birth pains  Goal: Patient verbalizes acceptable pain level  Outcome: PROGRESSING AS EXPECTED  Patient states pain is tolerable with pain medication.  Will continue to reassess with hourly rounding and medicate accordingly to 0-10 pain scale.    Problem: Potential knowledge deficit related to lack of understanding of self and  care  Goal: Patient will verbalize understanding of self and infant care  Outcome: PROGRESSING AS EXPECTED  Patient is able to care for self and baby and doesn't have any questions after discharge instructions were reviewed.

## 2018-01-25 NOTE — DISCHARGE INSTRUCTIONS
POSTPARTUM DISCHARGE INSTRUCTIONS FOR MOM    YOB: 1990   Age: 27 y.o.               Admit Date: 2018     Discharge Date: 2018  Attending Doctor:  Renee Chen*                  Allergies:  Patient has no known allergies.    Discharged to home by car. Discharged via wheelchair, hospital escort: Yes.  Special equipment needed: Not Applicable  Belongings with: Personal  Be sure to schedule a follow-up appointment with your primary care doctor or any specialists as instructed.     Discharge Plan:   Diet Plan: Discussed  Activity Level: Discussed  Confirmed Follow up Appointment: Patient to Call and Schedule Appointment  Confirmed Symptoms Management: Discussed  Medication Reconciliation Updated: Yes  Influenza Vaccine Indication: Not indicated: Previously immunized this influenza season and > 8 years of age  Influenza Vaccine Given - only chart on this line when given: Influenza Vaccine Given (See MAR)    REASONS TO CALL YOUR OBSTETRICIAN:  1.   Persistent fever or shaking chills (Temperature higher than 100.4)  2.   Heavy bleeding (soaking more than 1 pad per hour); Passing clots  3.   Foul odor from vagina  4.   Mastitis (Breast infection; breast pain, chills, fever, redness)  5.   Urinary pain, burning or frequency  6.   Episiotomy infection  7.   Abdominal incision infection  8.   Severe depression longer than 24 hours    HAND WASHING  · Prior to handling the baby.  · Before breastfeeding or bottle feeding baby.  · After using the bathroom or changing the baby's diaper.    WOUND CARE  Ask your physician for additional care instructions.  In general:    ·  Incision:      · Keep clean and dry.    · Do NOT lift anything heavier than your baby for up to 6 weeks.    · There should not be any opening or pus.      VAGINAL CARE  · Nothing inside vagina for 6 weeks: no sexual intercourse, tampons or douching.  · Bleeding may continue for 2-4 weeks.  Amount may vary.    · Call your  "physician for heavy bleeding which means soaking more than 1 pad per hour    BIRTH CONTROL  · It is possible to become pregnant at any time after delivery and while breastfeeding.  · Plan to discuss a method of birth control with your physician at your follow up visit. visit.    DIET AND ELIMINATION  · Eating more fiber (bran cereal, fruits, and vegetables) and drinking plenty of fluids will help to avoid constipation.  · Urinary frequency after childbirth is normal.    POSTPARTUM BLUES  During the first few days after birth, you may experience a sense of the \"blues\" which may include impatience, irritability or even crying.  These feeling come and go quickly.  However, as many as 1 in 10 women experience emotional symptoms known as postpartum depression.    Postpartum depression:  May start as early as the second or third day after delivery or take several weeks or months to develop.  Symptoms of \"blues\" are present, but are more intense:  Crying spells; loss of appetite; feelings of hopelessness or loss of control; fear of touching the baby; over concern or no concern at all about the baby; little or no concern about your own appearance/caring for yourself; and/or inability to sleep or excessive sleeping.  Contact your physician if you are experiencing any of these symptoms.    Crisis Hotline:  · [place] Crisis Hotline:  0-080-APVMRDL  Or 1-797.190.6562  · [place] Crisis Hotline:  1-951.477.1308  Or 893-061-2503    PREVENTING SHAKEN BABY:  If you are angry or stressed, PUT THE BABY IN THE CRIB, step away, take some deep breaths, and wait until you are calm to care for the baby.  DO NOT SHAKE THE BABY.  You are not alone, call a supporter for help.    · Crisis [facility] Center 24/7 crisis line 527-475-9483 or 1-180.961.1653  · You can also text them, text \"ANSWER\" to 015628    QUIT SMOKING/TOBACCO USE:  I understand the use of any tobacco products increases my chance of suffering from future heart disease and could " cause other illnesses which may shorten my life. Quitting the use of tobacco products is the single most important thing I can do to improve my health. For further information on smoking / tobacco cessation call a Toll Free Quit Line at 1-872.228.2881 (*[employer] [place]) or 1-362.539.8626 (American Lung Association) or you can access the web based program at www.lungusa.org.    · [place] Tobacco Users Help Line:  (996) 404-8788       Toll Free: 1-769.290.9060  · Quit Tobacco Program [facility] (540)848-4318    DEPRESSION / SUICIDE RISK:  As you are discharged from this [facility] [place], it is important to learn how to keep safe from harming yourself.    Recognize the warning signs:  · Abrupt changes in personality, positive or negative- including increase in energy   · Giving away possessions  · Change in eating patterns- significant weight changes-  positive or negative  · Change in sleeping patterns- unable to sleep or sleeping all the time   · Unwillingness or inability to communicate  · Depression  · Unusual sadness, discouragement and loneliness  · Talk of wanting to die  · Neglect of personal appearance   · Rebelliousness- reckless behavior  · Withdrawal from people/activities they love  · Confusion- inability to concentrate     If you or a loved one observes any of these behaviors or has concerns about self-harm, here's what you can do:  · Talk about it- your feelings and reasons for harming yourself  · Remove any means that you might use to hurt yourself (examples: pills, rope, extension cords, firearm)  · Get professional help from the community (Mental Health, Substance Abuse, psychological counseling)  · Do not be alone:Call your Safe Contact- someone whom you trust who will be there for you.  · Call your local CRISIS HOTLINE 320-8686 or 624-090-3973  · Call your local Children's Mobile Crisis Response Team [place] (300) 790-0161 or www.Frogdice  · Call  the toll free National Suicide Prevention Hotlines   · National Suicide Prevention Lifeline 786-838-TPOW (8464)  · National Hope Line Network 800-SUICIDE (651-9280)    DISCHARGE SURVEY:  Thank you for choosing [facility].  We hope we provided you with very good care.  You may be receiving a survey in the mail.  Please fill it out.  Your opinion is valuable to us.    ADDITIONAL EDUCATIONAL MATERIALS GIVEN TO PATIENT:  Pelvic rest for 6 weeks   Ambulate   Encourage breastfeeding   Depo-shot 150 mg IM prior to discharge if indicated   Sitz bath PRN     Pt need to RT TPC or ER if any of the following occur:   Fever over 100,5   Severe abd pain   Red streaks or painful masses in the breasts   Foul smelling d/c or lochia   Heavy vaginal bleeding saturating a pad per hour   S/s of PP depression    My signature on this form indicates that:  1.  I have reviewed and understand the above information  2.  My questions regarding this information have been answered to my satisfaction.  3.  I have formulated a plan with my discharge nurse to obtain my prescribed medication for home.

## 2018-01-25 NOTE — PROGRESS NOTES
Bedside report received, assumed care of pt. Assessment complete, VSS, fundus firm, lochia light. Pt voiding without difficulty. Bonding well with infant. Pt states pain is being well controlled and will call for PRN pain meds as needed. POC discussed with pt and family, questions answered, call light within reach.

## 2018-01-25 NOTE — DISCHARGE SUMMARY
Discharge Summary:      Dulce Pineda      Admit Date:   2018  Discharge Date:  2018     Admitting diagnosis:  Pregnancy  Indication for care in labor or delivery  Discharge Diagnosis: Status post vaginal, spontaneous.  Pregnancy Complications: premature labor  Tubal Ligation:  no        History:  No past medical history on file.  OB History    Para Term  AB Living   5 3 3   1 2   SAB TAB Ectopic Molar Multiple Live Births   1         2      # Outcome Date GA Lbr [place]/2nd Weight Sex Delivery Anes PTL Lv   5 Term 14 40w0d  3.5 kg (7 lb 11.5 oz) M Vag-Spont None N FRIDA   4 Term 12 40w0d  3.9 kg (8 lb 9.6 oz) F Vag-Spont None N FRIDA   3 SAB  4w0d          2             1 Term                    Patient has no known allergies.  Patient Active Problem List    Diagnosis Date Noted   • Indication for care in labor or delivery 2018        Hospital Course:   27 y.o. , now para 4, was admitted with the above mentioned diagnosis, underwent Active Labor, vaginal, spontaneous. Patient postpartum course was unremarkable, with progressive advancement in diet , ambulation and toleration of oral analgesia. Patient without complaints today and desires discharge.      Vitals:    18 0730 18 0800 18   BP: 103/65 101/62 109/60 (!) 94/57   Pulse: 82 86 75 75   Resp:    Temp: 36.7 °C (98.1 °F) 37.1 °C (98.7 °F) 37.3 °C (99.1 °F) 37.3 °C (99.2 °F)   TempSrc:       SpO2: 97% 98% 95% 98%   Weight:       Height:           Current Facility-Administered Medications   Medication Dose   • ondansetron (ZOFRAN ODT) dispertab 4 mg  4 mg    Or   • ondansetron (ZOFRAN) syringe/vial injection 4 mg  4 mg   • oxytocin (PITOCIN) infusion (for postpartum)   mL/hr   • ibuprofen (MOTRIN) tablet 600 mg  600 mg   • oxycodone-acetaminophen (PERCOCET) 5-325 MG per tablet 1 Tab  1 Tab   • oxycodone-acetaminophen (PERCOCET-10)  MG per tablet 1 Tab   1 Tab   • LR infusion     • PRN oxytocin (PITOCIN) (20 Units/1000 mL) PRN for excessive uterine bleeding - See Admin Instr  125-999 mL/hr   • misoprostol (CYTOTEC) tablet 800 mcg  800 mcg   • docusate sodium (COLACE) capsule 100 mg  100 mg       Exam:  Breast Exam: negative  Abdomen: Abdomen soft, non-tender. BS normal. No masses,  No organomegaly  Fundus Non Tender: no u/u   Incision: none  Perineum: perineum intact, lochia is mod to scant   Extremity: extremities, peripheral pulses and reflexes normal, no edema, redness or tenderness in the calves or thighs, no ulcers, gangrene or trophic changes. DTR's 2+     Labs:  Recent Labs      01/22/18   1215  01/23/18   0849   WBC  7.9  11.4*   RBC  4.61  4.08*   HEMOGLOBIN  13.2  12.0   HEMATOCRIT  39.8  36.1*   MCV  86.3  88.5   MCH  28.6  29.4   MCHC  33.2*  33.2*   RDW  50.3*  52.4*   PLATELETCT  119*  117*   MPV  13.0*  13.7*        Activity:   Discharge to home  Pelvic Rest x 6 weeks  Pelvic rest for 6 weeks  Ambulate  Encourage breastfeeding  Depo-shot 150 mg IM prior to discharge if indicated  RhoGam 300 mcg IM prior to discharge if indicated  MMR vaccine SQ prior to discharge if indicated    Sitz bath PRN  If diabetic, continue to check blood sugars as previously directed    Assessment:  normal postpartum course  Discharge Assessment: No heavy bleeding or foul vaginal discharge      Follow up: .TPCin 5 weeks for PP check Birth control options discussed pt desires the implant        Discharge Meds:   Current Outpatient Prescriptions   Medication Sig Dispense Refill   • ibuprofen (MOTRIN) 800 MG Tab Take 1 Tab by mouth every 8 hours as needed (For cramping after delivery; do not give if patient is receiving ketorolac (Toradol)). 30 Tab 1   • docusate sodium 100 MG Cap Take 100 mg by mouth 2 times a day as needed for Constipation. 60 Cap 1       MARY Love

## 2018-01-25 NOTE — PROGRESS NOTES
Discharge teaching reviewed with patient, all questions answered. Pt given all written information on self and infant care, including prescriptions and follow-up instructions. Pt doing well with infant, and feels comfortable with her feeding plan.  Discharged to home at 1230. Escorted out in wheelchair by staff.

## 2018-01-25 NOTE — CARE PLAN
Problem: Alteration in comfort related to episiotomy, vaginal repair and/or after birth pains  Goal: Patient is able to ambulate, care for self and infant  Outcome: PROGRESSING AS EXPECTED  Pt states pain is being adequately controlled, able to ambulate and care for self and infant. Encouraged pt to call for PRN pain medications as needed. Pain assessed q2-4 hours.     Problem: Potential anxiety related to difficulty adapting to parental role  Goal: Patient will verbalize and demonstrate effective bonding and parenting behavior  Outcome: PROGRESSING AS EXPECTED  Pt bonding well with infant. Recognizing cues and responding appropriately.

## 2019-11-08 NOTE — ADDENDUM NOTE
Encounter addended by: Zehra Isbell M.D. on: 11/8/2019 12:10 PM   Actions taken: Delete clinical note